# Patient Record
Sex: FEMALE | Race: WHITE | NOT HISPANIC OR LATINO | Employment: FULL TIME | URBAN - METROPOLITAN AREA
[De-identification: names, ages, dates, MRNs, and addresses within clinical notes are randomized per-mention and may not be internally consistent; named-entity substitution may affect disease eponyms.]

---

## 2017-06-14 ENCOUNTER — OFFICE VISIT - RIVER FALLS (OUTPATIENT)
Dept: FAMILY MEDICINE | Facility: CLINIC | Age: 47
End: 2017-06-14

## 2017-06-14 ASSESSMENT — MIFFLIN-ST. JEOR: SCORE: 1138.39

## 2017-06-22 ENCOUNTER — OFFICE VISIT - RIVER FALLS (OUTPATIENT)
Dept: FAMILY MEDICINE | Facility: CLINIC | Age: 47
End: 2017-06-22

## 2018-08-13 ENCOUNTER — OFFICE VISIT - RIVER FALLS (OUTPATIENT)
Dept: FAMILY MEDICINE | Facility: CLINIC | Age: 48
End: 2018-08-13

## 2018-08-13 ASSESSMENT — MIFFLIN-ST. JEOR: SCORE: 1129.32

## 2019-08-22 ENCOUNTER — OFFICE VISIT - RIVER FALLS (OUTPATIENT)
Dept: FAMILY MEDICINE | Facility: CLINIC | Age: 49
End: 2019-08-22

## 2019-08-22 ASSESSMENT — MIFFLIN-ST. JEOR: SCORE: 1137.48

## 2019-12-06 ENCOUNTER — AMBULATORY - RIVER FALLS (OUTPATIENT)
Dept: FAMILY MEDICINE | Facility: CLINIC | Age: 49
End: 2019-12-06

## 2020-06-25 ENCOUNTER — OFFICE VISIT - RIVER FALLS (OUTPATIENT)
Dept: FAMILY MEDICINE | Facility: CLINIC | Age: 50
End: 2020-06-25

## 2020-06-25 ASSESSMENT — MIFFLIN-ST. JEOR: SCORE: 1145.65

## 2021-01-05 ENCOUNTER — OFFICE VISIT - RIVER FALLS (OUTPATIENT)
Dept: FAMILY MEDICINE | Facility: CLINIC | Age: 51
End: 2021-01-05

## 2021-05-26 ENCOUNTER — RECORDS - HEALTHEAST (OUTPATIENT)
Dept: ADMINISTRATIVE | Facility: CLINIC | Age: 51
End: 2021-05-26

## 2021-11-23 ENCOUNTER — OFFICE VISIT - RIVER FALLS (OUTPATIENT)
Dept: FAMILY MEDICINE | Facility: CLINIC | Age: 51
End: 2021-11-23

## 2022-02-11 VITALS
DIASTOLIC BLOOD PRESSURE: 78 MMHG | SYSTOLIC BLOOD PRESSURE: 126 MMHG | HEART RATE: 72 BPM | TEMPERATURE: 98.3 F | WEIGHT: 127 LBS | HEIGHT: 62 IN | BODY MASS INDEX: 23.37 KG/M2

## 2022-02-12 VITALS
HEART RATE: 63 BPM | OXYGEN SATURATION: 99 % | SYSTOLIC BLOOD PRESSURE: 124 MMHG | DIASTOLIC BLOOD PRESSURE: 64 MMHG | BODY MASS INDEX: 24.51 KG/M2 | WEIGHT: 134 LBS | RESPIRATION RATE: 16 BRPM

## 2022-02-12 VITALS
HEART RATE: 56 BPM | HEIGHT: 62 IN | TEMPERATURE: 98.8 F | SYSTOLIC BLOOD PRESSURE: 132 MMHG | BODY MASS INDEX: 23.7 KG/M2 | WEIGHT: 128.8 LBS | DIASTOLIC BLOOD PRESSURE: 90 MMHG

## 2022-02-12 VITALS
DIASTOLIC BLOOD PRESSURE: 70 MMHG | TEMPERATURE: 97.8 F | SYSTOLIC BLOOD PRESSURE: 122 MMHG | WEIGHT: 129 LBS | SYSTOLIC BLOOD PRESSURE: 122 MMHG | HEIGHT: 62 IN | BODY MASS INDEX: 23.74 KG/M2 | DIASTOLIC BLOOD PRESSURE: 84 MMHG | HEART RATE: 52 BPM | TEMPERATURE: 98.1 F | HEART RATE: 72 BPM

## 2022-02-12 VITALS
HEART RATE: 68 BPM | HEIGHT: 62 IN | SYSTOLIC BLOOD PRESSURE: 120 MMHG | BODY MASS INDEX: 24.03 KG/M2 | WEIGHT: 130.6 LBS | TEMPERATURE: 99.2 F | DIASTOLIC BLOOD PRESSURE: 70 MMHG

## 2022-02-15 NOTE — NURSING NOTE
Comprehensive Intake Entered On:  1/5/2021 1:48 PM CST    Performed On:  1/5/2021 1:43 PM CST by Lanette Sosa MA               Summary   Chief Complaint :   verbal consent given for video visit.  med check, needs refills.  also discuss rash on eyelid--starting to improve.   Lanette Sosa MA - 1/5/2021 1:43 PM CST   Health Status   Allergies Verified? :   Yes   Medication History Verified? :   Yes   Immunizations Current :   Yes   Medical History Verified? :   Yes   Pre-Visit Planning Status :   Completed   Lanette Sosa MA - 1/5/2021 1:43 PM CST   Consents   Consent for Immunization Exchange :   Consent Granted   Consent for Immunizations to Providers :   Consent Granted   Lanette Sosa MA - 1/5/2021 1:43 PM CST   Meds / Allergies   (As Of: 1/5/2021 1:48:12 PM CST)   Allergies (Active)   penicillin  Estimated Onset Date:   Unspecified ; Created By:   Marleny Mojica; Reaction Status:   Active ; Category:   Drug ; Substance:   penicillin ; Type:   Allergy ; Updated By:   Marleny Mojica; Source:   Paper Chart ; Reviewed Date:   6/25/2020 2:27 PM CDT        Medication List   (As Of: 1/5/2021 1:48:12 PM CST)   Prescription/Discharge Order    albuterol  :   albuterol ; Status:   Prescribed ; Ordered As Mnemonic:   Ventolin HFA 90 mcg/inh inhalation aerosol ; Simple Display Line:   2 puff(s), inh, qid, 1 EA, 11 Refill(s) ; Ordering Provider:   Scooby Jaquez MD; Catalog Code:   albuterol ; Order Dt/Tm:   8/22/2019 2:50:33 PM CDT          fexofenadine  :   fexofenadine ; Status:   Prescribed ; Ordered As Mnemonic:   fexofenadine 180 mg oral tablet ; Simple Display Line:   180 mg, 1 tab(s), PO, Daily, 90 tab(s) ; Ordering Provider:   García Matute MD; Catalog Code:   fexofenadine ; Order Dt/Tm:   10/24/2011 4:41:16 PM CDT          fluticasone  :   fluticasone ; Status:   Prescribed ; Ordered As Mnemonic:   Flovent  mcg/inh inhalation aerosol ; Simple Display Line:   2 puff(s), inh, bid, 1  EA, 11 Refill(s) ; Ordering Provider:   Scooby Jaquez MD; Catalog Code:   fluticasone ; Order Dt/Tm:   8/22/2019 2:50:49 PM CDT            ID Risk Screen   Recent Travel History :   No recent travel   Family Member Travel History :   No recent travel   Other Exposure to Infectious Disease :   Unknown   Lanette Sosa MA - 1/5/2021 1:43 PM CST   Social History   Social History   (As Of: 1/5/2021 1:48:12 PM CST)   Alcohol:  Low Risk      (Last Updated: 3/9/2010 9:20:56 AM CST by Marleny Alba CMA )         Tobacco:  Denies Tobacco Use      Never (less than 100 in lifetime)   (Last Updated: 1/5/2021 1:45:49 PM CST by Lanette Sosa MA)          Electronic Cigarette/Vaping:        Electronic Cigarette Use: Never.   (Last Updated: 1/5/2021 1:45:54 PM CST by Lanette Sosa MA)          Employment/School:        Employed, Work/School description: Teacher.   (Last Updated: 4/27/2010 8:32:23 AM CDT by Elaina Kovacs CMA)          Exercise:  Regular exercise      Exercise frequency: 5-6 times/week.  Exercise type: Bicycling, Swimming, Weight lifting.   (Last Updated: 10/14/2011 8:50:18 AM CDT by Lennie DaS ilva)

## 2022-02-15 NOTE — NURSING NOTE
Depression Screening Entered On:  8/22/2019 2:46 PM CDT    Performed On:  8/22/2019 2:46 PM CDT by Ian JOYCE, Lanette               Depression Screening   Little Interest - Pleasure in Activities :   Not at all   Feeling Down, Depressed, Hopeless :   Not at all   Initial Depression Screen Score :   0    Trouble Falling or Staying Asleep :   More than half the days   Feeling Tired or Little Energy :   Not at all   Poor Appetite or Overeating :   Not at all   Feeling Bad About Yourself :   Not at all   Trouble Concentrating :   Not at all   Moving or Speaking Slowly :   Not at all   Thoughts Better Off Dead or Hurting Self :   Not at all   Detailed Depression Screen Score :   2    Total Depression Screen Score :   2    WALKER Difficulty with Work, Home, Others :   Somewhat difficult   Ian JOYCE, Lanette - 8/22/2019 2:46 PM CDT

## 2022-02-15 NOTE — PROGRESS NOTES
Chief Complaint    asthma f/u, med refills, pt referral for knees  History of Present Illness      Patient is here for follow-up on her asthma.  She has been doing well.  Symptoms.  She has needed beer all occasional basis.  She is compliant with use of her steroid inhaler.  She occasionally has trouble with asthma in the late summer due to seasonal allergies.       She would like a referral to physical therapy for ongoing patellofemoral syndrome.  She has been to physical therapy in the past with good results.  Review of Systems      See HPI.  All other review of systems negative.  Physical Exam   Vitals & Measurements    T: 98.3   F (Tympanic)  HR: 72(Peripheral)  BP: 126/78       Alert, oriented, no acute distress       Normal heart rate       Nonlabored breathing, lungs are clear, no wheezes, rhonchi       No obvious deformity of the knees, no crepitus, normal range of motion, no tenderness  Assessment/Plan       Asthma(J45.30)        Well-controlled, continue current controller and reliever therapy       Patellofemoral syndrome(M22.2X9)        Physical therapy, exercises needed, follow-up if not improving         Orders:          Physical Therapy (Request), Patellofemoral syndrome       Orders:         albuterol, 2 puff(s), inh, qid, # 1 EA, 11 Refill(s), Type: Hard Stop, Pharmacy: [facility], (Completed)         albuterol, 2 puff(s), inh, qid, # 1 EA, 11 Refill(s), Type: Maintenance, Pharmacy: [facility], 2 puff(s) Inhale qid, (Ordered)         fluticasone, 2 puff(s), inh, bid, # 1 EA, 11 Refill(s), Type: Hard Stop, Pharmacy: [facility], (Completed)         fluticasone, 2 puff(s), inh, bid, # 1 EA, 11 Refill(s), Type: Maintenance, Pharmacy: [facility], 2 puff(s) Inhale bid, (Ordered)  Patient Information     Name:EVI ARAGON      Address:      [address] [place] [address]      Sex:Female      YOB: 1970      [phone]      Emergency Contact:DECLINED, UNKNOWN     MRN:[id number]      FIN:[id number]     Location:[facility]     Date of Service:2018      Primary Care Physician:       Scooby Jaquez MD, [phone]      Attending Physician:       Scooby Jaquez MD, [phone]  Problem List/Past Medical History    Ongoing     Asthma     Chickenpox     Seasonal Allergies    Historical     No qualifying data  Procedure/Surgical History     Herniorrhaphy for recurrent inguinal hernia     TAB - Temporal artery biopsy     Therapeutic      Tonsillectomy  Medications        fexofenadine 180 mg oral tablet: 180 mg, 1 tab(s), PO, Daily, 90 tab(s).        Ventolin HFA 90 mcg/inh inhalation aerosol: 2 puff(s), inh, qid, 1 EA, 11 Refill(s).        Flovent  mcg/inh inhalation aerosol: 2 puff(s), inh, bid, 1 EA, 11 Refill(s).                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 Exercise type: Bicycling, Swimming, Weight lifting., 10/14/2011     Tobacco - Denies Tobacco Use, 2010  Family History    Alzheimer's disease: Grandmother (P).    Asthma: Grandfather (P).    COPD - Chronic obstructive pulmonary disease: Grandfather (P).    Cancer: Negative: Grandfather (P).    Cardiomyopathy: Father.    Hypertension: Mother and Grandfather (M).  Immunizations      Vaccine Date Status      influenza virus vaccine, inactivated 2016 Given      tetanus/diphth/pertuss (Tdap) adult/adol 2016 Given      influenza virus vaccine, inactivated 10/27/2014 Given      influenza virus vaccine, inactivated 2013 Given      influenza virus vaccine, inactivated 10/31/2012 Given      influenza virus vaccine, inactivated 10/10/2011 Given      pneumococcal (PPSV23) 2010 Given      Td  07/15/2004 Recorded      Td 01/01/1996 Recorded

## 2022-02-15 NOTE — NURSING NOTE
Asthma Control Test (ACT) Total Entered On:  8/22/2019 2:46 PM CDT    Performed On:  8/22/2019 2:46 PM CDT by Lanette Sosa MA               Asthma Control Test (ACT) Total   Asthma Control Test Total (Adult) :   20    Lanette Sosa MA - 8/22/2019 2:46 PM CDT

## 2022-02-15 NOTE — PROGRESS NOTES
Chief Complaint    Pt c/o finding a lump L side breast that was painful. It was irriatating with her bra rubbing on it.  History of Present Illness      noticed a left axillary mass 8 days ago, it was painful for 24 hours, then it receded within 2 days, occurred about 2 days after getting a covid vaccine on the left and a flu on the right      asthma well controlled, needs med refills      need for cervical cancer screening,      overdue for breast cancer screening  Review of Systems      neg except HPI  Physical Exam   Vitals & Measurements    HR: 63 (Peripheral)  RR: 16  BP: 124/64  SpO2: 99%     WT: 134 lb       breast, no masses, no axillary  LAD, no skin dimpling, no nipple discharge  Assessment/Plan       Asthma (J45.30)         well controlled, cont current meds         Ordered:          Miscellaneous Prescription, valved holding chamber spacer, See Instructions, Instructions: use with albuterol, Supply, # 1 EA, 0 Refill(s), Type: Maintenance, Pharmacy: [facility], use with albuterol, 62, in, 06/25/20 14:24:00 CDT, Height Measured, 134, lb, 11/23/21 9:03:00 CS..., (Ordered)                Breast cancer screening (Z12.39)         given info to local hospitals, can call to schedule appt                Cervical cancer screening (Z12.4)         overdue                Left axillary swelling (M79.89)         now resolved, occurred after flu and covid vaccines, suspect reactive LAD, still needs routine breast cancer screening                Screening for diabetes mellitus (DM) (Z13.1)         overdue                Screening, lipid (Z13.220)         overdue                Vaccine reaction (T50.Z95A)         caused left ax swelling, now resolved                Orders:         albuterol, 2 puff(s), inh, qid, # 1 EA, 3 Refill(s), Type: Maintenance, Pharmacy: [facility], 2 puff(s) Inhale qid, 62, in, 06/25/20 14:24:00 CDT, Height Measured, 134, lb, 11/23/21 9:03:00 CST, Weight Measured, (Ordered)          fluticasone, = 2 puff(s), inh, bid, # 3 EA, 3 Refill(s), Type: Maintenance, Pharmacy: [facility], 2 puff(s) Inhale bid, 62, in, 20 14:24:00 CDT, Height Measured, 134, lb, 21 9:03:00 CST, Weight Measured, (Ordered)         Return to Clinic (Request), Return in 4-5 weeks, after labs for annual well adult with pap         Return to Clinic (Request), Return in 4 weeks lab only fasting lipid panel and BMP for screening      will have pt get her fasting screening labs done when she is in [place] break in about  a month and then have annual well tommy exam wheree we can get her pap up to date and review labs  Patient Information     Name:EVI ARAGON      Address:      [address] [place] [id number]     Sex:Female     YOB: 1970     [phone]     Emergency Contact:JERE ARAGON     MRN:[id number]     FIN:[id number]     Location:[facility]     Date of Service:2021      Primary Care Physician:       Scooby Jaquez MD, [phone]      Attending Physician:       More Gracia MD, [phone]  Problem List/Past Medical History    Ongoing     Asthma     Chickenpox     Seasonal allergies    Historical     No qualifying data  Procedure/Surgical History     Esophagogastroduodenoscopy (2019)      Comments: Indication:  Abdominal pain; failure to respond to treatment..     Herniorrhaphy for recurrent inguinal hernia     TAB - Temporal artery biopsy     Therapeutic      Tonsillectomy  Medications    Flovent  mcg/inh inhalation aerosol, 2 puff(s), Inhale, bid, 11 refills    Ventolin HFA 90 mcg/inh inhalation aerosol, 2 puff(s), Inhale, qid, 11 refills  Allergies    penicillin  Social History    Smoking Status     Never smoker     Alcohol - Low Risk     Electronic Cigarette/Vaping      Electronic Cigarette Use: Never.     Employment/School      Employed, Work/School description: Teacher.     Exercise - Regular exercise      Exercise  frequency: 5-6 times/week. Exercise type: Bicycling, Swimming, Weight lifting.     Tobacco - Denies Tobacco Use      Never (less than 100 in lifetime)  Family History    Alzheimer's disease: Grandmother (P).    Asthma: Grandfather (P).    COPD - Chronic obstructive pulmonary disease: Grandfather (P).    Cancer: Negative: Grandfather (P).    Cardiomyopathy: Father.    Hypertension: Mother and Grandfather (M).  Immunizations       Scheduled Immunizations       Dose Date(s)       pneumococcal (PPSV23)       08/26/2010       Other Immunizations               influenza virus vaccine, inactivated       10/10/2011, 10/31/2012, 09/24/2013, 10/27/2014, 08/23/2016, 12/06/2019       Td       01/01/1996, 07/15/2004       tetanus/diphth/pertuss (Tdap) adult/adol       07/21/2016

## 2022-02-15 NOTE — NURSING NOTE
Comprehensive Intake Entered On:  6/25/2020 2:31 PM CDT    Performed On:  6/25/2020 2:24 PM CDT by Josy Puga CMA               Summary   Chief Complaint :   c/o right thumb pain for the past 2 weeks and check moles on back    Weight Measured :   130.6 lb(Converted to: 130 lb 10 oz, 59.24 kg)    Height Measured :   62 in(Converted to: 5 ft 2 in, 157.48 cm)    Body Mass Index :   23.88 kg/m2   Body Surface Area :   1.61 m2   Systolic Blood Pressure :   120 mmHg   Diastolic Blood Pressure :   70 mmHg   Mean Arterial Pressure :   87 mmHg   Peripheral Pulse Rate :   68 bpm   BP Site :   Right arm   Pulse Site :   Radial artery   BP Method :   Manual   HR Method :   Manual   Temperature Tympanic :   99.2 DegF(Converted to: 37.3 DegC)    Josy Puga CMA - 6/25/2020 2:24 PM CDT   Health Status   Allergies Verified? :   Yes   Medication History Verified? :   Yes   Immunizations Current :   Yes   Medical History Verified? :   No   Pre-Visit Planning Status :   Not completed   Tobacco Use? :   Never smoker   Josy Puga CMA - 6/25/2020 2:24 PM CDT   Consents   Consent for Immunization Exchange :   Consent Granted   Consent for Immunizations to Providers :   Consent Granted   Josy Puga CMA - 6/25/2020 2:24 PM CDT   Meds / Allergies   (As Of: 6/25/2020 2:31:10 PM CDT)   Allergies (Active)   penicillin  Estimated Onset Date:   Unspecified ; Created By:   Marleny Mojica; Reaction Status:   Active ; Category:   Drug ; Substance:   penicillin ; Type:   Allergy ; Updated By:   Marleny Mojica; Source:   Paper Chart ; Reviewed Date:   6/25/2020 2:27 PM CDT        Medication List   (As Of: 6/25/2020 2:31:10 PM CDT)   Prescription/Discharge Order    albuterol  :   albuterol ; Status:   Prescribed ; Ordered As Mnemonic:   Ventolin HFA 90 mcg/inh inhalation aerosol ; Simple Display Line:   2 puff(s), inh, qid, 1 EA, 11 Refill(s) ; Ordering Provider:   Scooby Jaquez MD; Catalog Code:   albuterol ; Order  Dt/Tm:   8/22/2019 2:50:33 PM CDT          fexofenadine  :   fexofenadine ; Status:   Prescribed ; Ordered As Mnemonic:   fexofenadine 180 mg oral tablet ; Simple Display Line:   180 mg, 1 tab(s), PO, Daily, 90 tab(s) ; Ordering Provider:   García Matute MD; Catalog Code:   fexofenadine ; Order Dt/Tm:   10/24/2011 4:41:16 PM CDT          fluticasone  :   fluticasone ; Status:   Prescribed ; Ordered As Mnemonic:   Flovent  mcg/inh inhalation aerosol ; Simple Display Line:   2 puff(s), inh, bid, 1 EA, 11 Refill(s) ; Ordering Provider:   Scooby Jaquez MD; Catalog Code:   fluticasone ; Order Dt/Tm:   8/22/2019 2:50:49 PM CDT            ID Risk Screen   Recent Travel History :   No recent travel   Family Member Travel History :   No recent travel   Other Exposure to Infectious Disease :   Unknown   Josy Puga CMA - 6/25/2020 2:24 PM CDT

## 2022-02-15 NOTE — TELEPHONE ENCOUNTER
---------------------  From: Aysha Leal LPN (Phone Messages Pool ([id number]_[facility]))   To: Phone Messages Pool ([id number]_[place] [facility] [place]);     Sent: 4/19/2019 11:13:57 AM CDT  Subject: General Message     Phone Message    PCP:   JAM      Time of Call:  9:44am       Person Calling:  pt  Phone number:  [phone]    Returned call at: 11:12am    Note:   Pt LM stating she is wondering if she needs appt. Pt says she would like to talk to someone about her symptoms and will be available until about 10.    Returned call and asked that pt call back and leave detailed message with symptoms.    Last office visit and reason:  8/13/18 Follow up asthma, patellofemoral syndrome3:24pm Pt calls back and left another message. She c/o a rash on her arm that has been present for several weeks. The rash is itchy. Wondering if she needs an appt.  3:31pm Called and spoke with pt. Advised she be seen being that it has been going on for so long. I told her we have walk in hours from 8-5 tomorrow. She would like to set something up for Monday if possible. Transferred to scheduling.

## 2022-02-15 NOTE — NURSING NOTE
CAGE Assessment Entered On:  8/22/2019 2:46 PM CDT    Performed On:  8/22/2019 2:46 PM CDT by Lanette Sosa MA               Assessment   Have you ever felt you should cut down on your drinking :   No   Have people annoyed you by criticizing your drinking :   No   Have you ever felt bad or guilty about your drinking :   No   Have you ever taken a drink first thing in the morning to steady your nerves or get rid of a hangover (Eye-opener) :   No   CAGE Score :   0    Lanette Sosa MA - 8/22/2019 2:46 PM CDT

## 2022-02-15 NOTE — PROGRESS NOTES
Chief Complaint    med check.   also c/o rash on left hand and left forearm.  History of Present Illness      Patient is here for her asthma follow up and refill her medications.  Her asthma has been fairly well controlled, is worse when it's humid outside.  Uses her Albuterol as needed, has had to use more lately.  She also uses Flovent daily.  Denies visits to the ER or hospital.      She also complains of having a rash to her left hand and left forearm.  She did get stung by a wasp while cleaning out her flower bed as well as falling in another flower bed that she was clearing out.  She does have some redness and mild swelling to both sites.  The discomfort is worse at night.  She has tried using Benadryl gel and Cortisone cream.  Has also tried oral Benadryl last night and generic Allegra yesterday, applying ice.  Review of Systems           See HPI.  All other review of systems negative.              Physical Exam   Vitals & Measurements    T: 98.8   F (Tympanic)  HR: 56(Peripheral)  BP: 132/90     HT: 62 in  WT: 128.8 lb  BMI: 23.56           General:  Alert and oriented, No acute distress.            Eye:  Pupils are equal, round and reactive to light, Normal conjunctiva.            HENT:  Oral mucosa is moist.            Neck:  Supple.            Respiratory:  Respirations are non-labored.            Cardiovascular:  Normal rate, Regular rhythm, No edema.            Gastrointestinal:  Non-distended.            Musculoskeletal:  Normal gait.            Integumentary:  Warm, Rash to left hand and forearm, slight warmth to left hand, mild swelling and redness.            Psychiatric:  Cooperative, Appropriate mood & affect, Normal judgment.           Patient s PHQ-9 and CAGE questionnaire reviewed and discussed with patient.                  PHQ-9=2         Assessment/Plan       1. Asthma (J45.30)        Stable, continue with current medications.  Refills sent in.  RTC 1year for asthma follow up.       2. Wasp  sting (T63.461A)        Continue with Benadryl, switch from Allegra to Zyrtec for antihistamine.  Also try using OTC topical lidocaine for pain relief.       Orders:         albuterol, 2 puff(s), inh, qid, # 1 EA, 11 Refill(s), Type: Maintenance, Pharmacy: [facility], 2 puff(s) Inhale qid, (Ordered)         albuterol, 2 puff(s), inh, qid, # 1 EA, 0 Refill(s), Type: Hard Stop, Pharmacy: [facility], (Completed)         fluticasone, = 2 puff(s), inh, bid, # 1 EA, 0 Refill(s), Type: Hard Stop, Pharmacy: [facility], (Completed)         fluticasone, = 2 puff(s), inh, bid, # 1 EA, 11 Refill(s), Type: Maintenance, Pharmacy: [facility], 2 puff(s) Inhale bid, (Ordered)         Return to Clinic (Request), RFV: Medication check for asthma, Return in 1yr      Lanette DEAN MA, acted solely as a scribe for, and in the presence of Dr. Scooby Jaquez who performed the services.             I, Scooby Jaquez MD, personally performed the services described in this documentation.  The documentation was scribed in my presence and is both accurate and complete.                Patient Information     Name:EVI ARAGON      Address:      [address] [place] [id number]     Sex:Female     YOB: 1970     [phone]     Emergency Contact:DECLINED, UNKNOWN     MRN:[id number]     FIN:[id number]     Location[facility]     Date of Service:08/22/2019      Primary Care Physician:       Scooby Jaquez MD, [phone]      Attending Physician:       Scooby Jaquez MD, [phone]  Problem List/Past Medical History    Ongoing     Asthma     Chickenpox     Seasonal allergies    Historical     No qualifying data  Procedure/Surgical History     Esophagogastroduodenoscopy (02/08/2019)            Comments: Indication:  Abdominal pain; failure to respond to treatment..     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 Exercise type: Bicycling, Swimming, Weight lifting., 10/14/2011     Tobacco - Denies Tobacco Use, 2010  Family History    Alzheimer's disease: Grandmother (P).    Asthma: Grandfather (P).    COPD - Chronic obstructive pulmonary disease: Grandfather (P).    Cancer: Negative: Grandfather (P).    Cardiomyopathy: Father.    Hypertension: Mother and Grandfather (M).  Immunizations      Vaccine Date Status      influenza virus vaccine, inactivated 2016 Given      tetanus/diphth/pertuss (Tdap) adult/adol 2016 Given      influenza virus vaccine, inactivated 10/27/2014 Given      influenza virus vaccine, inactivated 2013 Given      influenza virus vaccine, inactivated 10/31/2012 Given      influenza virus vaccine, inactivated 10/10/2011 Given      pneumococcal (PPSV23) 2010 Given      Td 07/15/2004 Recorded      Td 1996 Recorded

## 2022-02-15 NOTE — NURSING NOTE
Phone Message    PCP:    JAM      Time of Call:  8:12       Person Calling:  Pt  Phone number:  [phone]    Time returned call:  8:35    Note:  Bebe called needed a refill of Flovent.  she was last seen in 6?17 but forgot to mention refills.  ACT done and her score was 17.  Her symptoms have been exacerbated recently due to pollen but usually is only bothered occasionally  with SOB then has long spells without any symptoms.    Advised to f/u with JAM yearly for asthma focused visit.    Flovent refilled

## 2022-02-15 NOTE — PROGRESS NOTES
Chief Complaint    verbal consent given for video visit.  med check, needs refills.  also discuss rash on eyelid--starting to improve.   Visit type:  Video visit via [employer] or [employer]   Participants in room during visit: Patient   Location of patient: Work   Location of physician: Office   Video start time: 1410   Video end time: 1423   Today's visit was conducted by video conference due to the COVID-19 pandemic.  The patient's consent to proceed with the video conference was obtained and documented.  History of Present Illness       Patient has concerns today about a rash on her eyelids.  This is, in the last few weeks.  She denies any new exposures.  There has been no swelling.       She is requesting refill of her inhalers for her well-controlled persistent asthma.  She rarely uses her albuterol inhaler.  She is compliant with fluticasone inhaler.       She also has seasonal allergies which have been a bit better this year.  She uses over-the-counter antihistamines.  She is currently taking cetirizine.  Review of Systems       See HPI.  All other review of systems negative.  Physical Exam       Appears well, no acute distress  Assessment/Plan       1. Asthma (J45.30)         Mild persistent asthma under control with current medication regimen.  Refill medications today.       2. Seasonal allergies (J30.2)         Seasonal allergies controlled with over-the-counter antihistamines       3. Dermatitis, eyelid (H01.9)         Eyelid dermatitis unclear cause, suspect contact dermatitis         Advise over-the-counter hydrocortisone cream applied sparingly and follow-up in the next couple weeks if symptoms not improving       Orders:         albuterol, 2 puff(s), inh, qid, # 1 EA, 11 Refill(s), Type: Hard Stop, Pharmacy: [facility], (Completed)         albuterol, 2 puff(s), inh, qid, # 1 EA, 11 Refill(s), Type: Maintenance, Pharmacy: [facility], 2 puff(s) Inhale qid, 62, in, 06/25/20 14:24:00 CDT, Height  Measured, 130.6, lb, 20 14:24:00 CDT, Weight Measured, (Ordered)         fluticasone, = 2 puff(s), inh, bid, # 1 EA, 11 Refill(s), Type: Hard Stop, Pharmacy: [facility], (Completed)         fluticasone, = 2 puff(s), inh, bid, # 1 EA, 11 Refill(s), Type: Maintenance, Pharmacy: [facility], 2 puff(s) Inhale bid, 62, in, 20 14:24:00 CDT, Height Measured, 130.6, lb, 20 14:24:00 CDT, Weight Measured, (Ordered)  Patient Information     Name:EVI ARAGON      Address:      [address] [place] [id number]     Sex:Female     YOB: 1970     [phone]     Emergency Contact:JERE ARAGON     MRN:[id number]     FIN:[id number]     Location:[facility]     Date of Service:2021      Primary Care Physician:       Scooby Jaquez MD, [phone]      Attending Physician:       Scooby Jaquez MD, [phone]  Problem List/Past Medical History    Ongoing     Asthma     Chickenpox     Seasonal allergies    Historical     No qualifying data  Procedure/Surgical History     Esophagogastroduodenoscopy (2019)      Comments: Indication:  Abdominal pain; failure to respond to treatment..     Herniorrhaphy for recurrent inguinal hernia     TAB - Temporal artery biopsy     Therapeutic      Tonsillectomy  Medications    fexofenadine 180 mg oral tablet, 180 mg= 1 tab(s), Oral, daily, 3 refills    Flovent  mcg/inh inhalation aerosol, 2 puff(s), Inhale, bid, 11 refills    Ventolin HFA 90 mcg/inh inhalation aerosol, 2 puff(s), Inhale, qid, 11 refills  Allergies    penicillin  Social History    Smoking Status     Never smoker     Alcohol - Low Risk     Electronic Cigarette/Vaping      Electronic Cigarette Use: Never.     Employment/School      Employed, Work/School description: Teacher.     Exercise - Regular exercise      Exercise frequency: 5-6 times/week. Exercise type: Bicycling, Swimming, Weight lifting.     Tobacco - Denies  Tobacco Use      Never (less than 100 in lifetime)  Family History    Alzheimer's disease: Grandmother (P).    Asthma: Grandfather (P).    COPD - Chronic obstructive pulmonary disease: Grandfather (P).    Cancer: Negative: Grandfather (P).    Cardiomyopathy: Father.    Hypertension: Mother and Grandfather (M).  Immunizations      Vaccine Date Status          influenza virus vaccine, inactivated 12/06/2019 Given          influenza virus vaccine, inactivated 08/23/2016 Given          tetanus/diphth/pertuss (Tdap) adult/adol 07/21/2016 Given          influenza virus vaccine, inactivated 10/27/2014 Given          influenza virus vaccine, inactivated 09/24/2013 Given          influenza virus vaccine, inactivated 10/31/2012 Given          influenza virus vaccine, inactivated 10/10/2011 Given          pneumococcal (PPSV23) 08/26/2010 Given          Td 07/15/2004 Recorded          Td 01/01/1996 Recorded

## 2022-02-15 NOTE — NURSING NOTE
Comprehensive Intake Entered On:  11/23/2021 9:13 AM CST    Performed On:  11/23/2021 9:03 AM CST by Fallon Cabral               Summary   Chief Complaint :   Pt c/o finding a lump L side breast that was painful. It was irriatating with her bra rubbing on it.     Weight Measured :   134 lb(Converted to: 134 lb 0 oz, 60.781 kg)    Systolic Blood Pressure :   124 mmHg   Diastolic Blood Pressure :   64 mmHg   Mean Arterial Pressure :   84 mmHg   Peripheral Pulse Rate :   63 bpm   BP Site :   Right arm   BP Method :   Manual   Respiratory Rate :   16 br/min   Oxygen Saturation :   99 %   Fallon Cabral - 11/23/2021 9:03 AM CST   Health Status   Allergies Verified? :   Yes   Medication History Verified? :   Yes   Immunizations Current :   Yes   Tobacco Use? :   Never smoker   Fallon Cabral - 11/23/2021 9:03 AM CST   Consents   Consent for Immunization Exchange :   Consent Granted   Consent for Immunizations to Providers :   Consent Granted   Fallon Cabral - 11/23/2021 9:03 AM CST   Meds / Allergies   (As Of: 11/23/2021 9:13:13 AM CST)   Allergies (Active)   penicillin  Estimated Onset Date:   Unspecified ; Created By:   Marleny Mojica; Reaction Status:   Active ; Category:   Drug ; Substance:   penicillin ; Type:   Allergy ; Updated By:   Marleny Mojica; Source:   Paper Chart ; Reviewed Date:   11/23/2021 9:13 AM CST        Medication List   (As Of: 11/23/2021 9:13:13 AM CST)   Prescription/Discharge Order    albuterol  :   albuterol ; Status:   Prescribed ; Ordered As Mnemonic:   Ventolin HFA 90 mcg/inh inhalation aerosol ; Simple Display Line:   2 puff(s), inh, qid, 1 EA, 11 Refill(s) ; Ordering Provider:   Scooby Jaquez MD; Catalog Code:   albuterol ; Order Dt/Tm:   1/5/2021 2:23:46 PM CST          fexofenadine  :   fexofenadine ; Status:   Processing ; Ordered As Mnemonic:   fexofenadine 180 mg oral tablet ; Ordering Provider:   García Matute MD; Action Display:   Complete ; Catalog Code:    fexofenadine ; Order Dt/Tm:   11/23/2021 9:10:12 AM CST          fluticasone  :   fluticasone ; Status:   Prescribed ; Ordered As Mnemonic:   Flovent  mcg/inh inhalation aerosol ; Simple Display Line:   2 puff(s), inh, bid, 1 EA, 11 Refill(s) ; Ordering Provider:   Scooby Jaquez MD; Catalog Code:   fluticasone ; Order Dt/Tm:   1/5/2021 2:23:45 PM CST            Social History   Social History   (As Of: 11/23/2021 9:13:13 AM CST)   Alcohol:  Low Risk      (Last Updated: 3/9/2010 9:20:56 AM CST by Marleny Alba CMA )         Tobacco:  Denies Tobacco Use      Never (less than 100 in lifetime)   (Last Updated: 1/5/2021 1:45:49 PM CST by Lanette Sosa MA)          Electronic Cigarette/Vaping:        Electronic Cigarette Use: Never.   (Last Updated: 1/5/2021 1:45:54 PM CST by Lanette Sosa MA)          Employment/School:        Employed, Work/School description: Teacher.   (Last Updated: 4/27/2010 8:32:23 AM CDT by Elaina Kovacs CMA)          Exercise:  Regular exercise      Exercise frequency: 5-6 times/week.  Exercise type: Bicycling, Swimming, Weight lifting.   (Last Updated: 10/14/2011 8:50:18 AM CDT by Lennie Da Silva)

## 2022-02-15 NOTE — NURSING NOTE
Comprehensive Intake Entered On:  8/22/2019 2:25 PM CDT    Performed On:  8/22/2019 2:18 PM CDT by Ian JOYCE, Lanette               Summary   Chief Complaint :   med check.   also c/o rash on left hand and left forearm.   Weight Measured :   128.8 lb(Converted to: 128 lb 13 oz, 58.42 kg)    Height Measured :   62 in(Converted to: 5 ft 2 in, 157.48 cm)    Body Mass Index :   23.56 kg/m2   Body Surface Area :   1.6 m2   Systolic Blood Pressure :   132 mmHg (HI)    Diastolic Blood Pressure :   90 mmHg (HI)    Mean Arterial Pressure :   104 mmHg   Peripheral Pulse Rate :   56 bpm (LOW)    BP Site :   Right arm   Pulse Site :   Radial artery   BP Method :   Manual   HR Method :   Manual   Temperature Tympanic :   98.8 DegF(Converted to: 37.1 DegC)    Lanette Sosa MA - 8/22/2019 2:18 PM CDT   Health Status   Allergies Verified? :   Yes   Medication History Verified? :   Yes   Immunizations Current :   Yes   Medical History Verified? :   Yes   Pre-Visit Planning Status :   Completed   Tobacco Use? :   Never smoker   Lanette Sosa MA - 8/22/2019 2:18 PM CDT   Consents   Consent for Immunization Exchange :   Consent Granted   Consent for Immunizations to Providers :   Consent Granted   Lanette Sosa MA - 8/22/2019 2:18 PM CDT   Meds / Allergies   (As Of: 8/22/2019 2:25:42 PM CDT)   Allergies (Active)   penicillin  Estimated Onset Date:   Unspecified ; Created By:   Marleny Mojica; Reaction Status:   Active ; Category:   Drug ; Substance:   penicillin ; Type:   Allergy ; Updated By:   Marleny Mojica; Source:   Paper Chart ; Reviewed Date:   8/13/2018 11:15 AM CDT        Medication List   (As Of: 8/22/2019 2:25:42 PM CDT)   Prescription/Discharge Order    albuterol  :   albuterol ; Status:   Prescribed ; Ordered As Mnemonic:   Ventolin HFA 90 mcg/inh inhalation aerosol ; Simple Display Line:   2 puff(s), inh, qid, 1 EA, 0 Refill(s) ; Ordering Provider:   Scooby Jaquez MD; Catalog Code:   albuterol ;  Order Dt/Tm:   8/19/2019 11:15:18 AM          fexofenadine  :   fexofenadine ; Status:   Prescribed ; Ordered As Mnemonic:   fexofenadine 180 mg oral tablet ; Simple Display Line:   180 mg, 1 tab(s), PO, Daily, 90 tab(s) ; Ordering Provider:   García Matute MD; Catalog Code:   fexofenadine ; Order Dt/Tm:   10/24/2011 4:41:16 PM          fluticasone  :   fluticasone ; Status:   Prescribed ; Ordered As Mnemonic:   Flovent  mcg/inh inhalation aerosol ; Simple Display Line:   2 puff(s), inh, bid, 1 EA, 0 Refill(s) ; Ordering Provider:   Scooby Jaquez MD; Catalog Code:   fluticasone ; Order Dt/Tm:   8/19/2019 11:15:18 AM            Social History   Social History   (As Of: 8/22/2019 2:25:42 PM CDT)   Alcohol:  Low Risk      (Last Updated: 3/9/2010 9:20:56 AM CST by Marleny Alba CMA )         Tobacco:  Denies Tobacco Use      (Last Updated: 3/9/2010 9:21:01 AM CST by Marleny Alba CMA )         Employment/School:        Employed, Work/School description: Teacher.   (Last Updated: 4/27/2010 8:32:23 AM CDT by Elaina Kovacs CMA)          Exercise:  Regular exercise      Exercise frequency: 5-6 times/week.  Exercise type: Bicycling, Swimming, Weight lifting.   (Last Updated: 10/14/2011 8:50:18 AM CDT by Lennie Da Silva)

## 2022-02-15 NOTE — PROGRESS NOTES
Chief Complaint  here to have growth on back of head checked. is getting larger in size and more tender. also discuss recurring pain in right arm. has done PT in past.  History of Present Illness  Patient is here with an enlarging tender epidermoid cyst in the back of her scalp. ?She would like to have this removed. ?Seems to be more symptomatic lately.  She also has anterior right shoulder pain this has improved since she has been doing physical therapy. ?If she does a lot of shoulder work with weights especially shoulder or bench presses it causes some trouble.  Review of Systems  Negative except for above  Problem List/Past Medical History  Ongoing  Asthma  Seasonal Allergies  Resolved  No resolved problems  Procedure/Surgical History  Herniorrhaphy for recurrent inguinal hernia,  TAB - Temporal artery biopsy,  Therapeutic ,  Tonsillectomy.  Home Medications  fexofenadine 180 mg oral tablet, 180 mg, 1 tab(s), po, daily, 3 refills  Flovent  mcg/inh inhalation aerosol, 2 puff(s), inh, bid, 11 refills  Ventolin HFA 90 mcg/inh inhalation aerosol, 2 puff(s), inh, qid, 11 refills  Allergies  penicillin  Social History  Employment and Education  Employed, Work/School description: Teacher.  Exercise and Physical Activity  Exercise frequency: 5-6 times/week. Exercise type: Bicycling, Swimming, Weight lifting.  Family History  Alzheimer's disease: Grandmother (P).  Asthma: Grandfather (P).  COPD - Chronic obstructive pulmonary disease: Grandfather (P).  Cardiomyopathy: Father.  Hypertension: Mother  and Grandfather (M).  Immunizations  Physical Exam  Vitals & Measurements  T:?97.8?(Tympanic)?  HR:?72?(Peripheral)?  BP:?122/84?  HT:?62?in?  WT:?129?lb?  BMI:?23.59?  Alert, oriented, no acute distress  2 cm slightly tender epidermoid cyst on the occiput  Anterior right shoulder tenderness over the biceps tendon, normal strength, normal range of motion  Assessment/Plan  Epidermoid cyst  Informed consent was  obtained  Skin around the cyst was cleansed with Betadine, anesthesia with 1% lidocaine with epinephrine  12 mm incision was made over the cyst and the skin was bluntly dissected away from the cyst. ?I was unable to express the cyst completely from the incision so the contents of the cyst was expressed and the sac was removed with blunt dissection. ?The?incision was closed with 3 staples. ?She will return in 7-10 days for staple removal  Tendinopathy of right biceps tendon  Advised rest, avoidance of activities that irritate the shoulder and follow-up if not resolve over the next several weeks

## 2022-02-15 NOTE — LETTER
(Inserted Image. Unable to display)     August 24, 2020      EVI ARAGON  [address]  [place], [place] [id number]          Dear EVI,      Thank you for selecting [facility] (previously [facility]) for your healthcare needs.      Our records indicate you are due for the following services:     Asthma Follow-up Office Visit ~ Please bring in your inhalers/asthma medications that you are currently using to your visit.       To schedule an appointment or if you have further questions, please contact your primary clinic:   [facility]       [phone]   [facility]       [phone]              [facility]     [phone]      Powered by [employer]    Sincerely,    Scooby Jaquez MD

## 2022-02-15 NOTE — LETTER
(Inserted Image. Unable to display)   December 22, 2021  EVI ARAGON  [address] [place] [address]        Dear EVI,    Thank you for selecting [facility] for your healthcare needs.    Our records indicate you are due for the following services:     Annual Physical and Gynecologic Exam ~ Yearly wellness exams are important for your ongoing health and wellness.  This exam gives you the opportunity to meet with your Healthcare Provider to review your health, update immunizations and to recommend preventive screenings that you may be due for.  At your wellness visit, your health care provider will determine the need for a gynecologic exam and/or pap smear.     Fasting Lab Tests ~ Please do not eat or drink anything 10 hours prior to your scheduled appointment time.  (Water and any medications that you may need are allowed unless directed otherwise.)     If you had your labs done at another facility or with Direct Access Lab Testing at [facility], please bring in a copy of the results to your next visit, mail a copy, or drop off a copy of your results to your Healthcare Provider.     (FYI   Regarding office visits: In some instances, a video visit or telephone visit may be offered as an option.)    To schedule an appointment or if you have further questions, please contact your clinic at [phone].    Powered by [employer]    Sincerely,    More Gracia MD

## 2022-02-15 NOTE — PROGRESS NOTES
Patient:   EVI ARAGON            MRN: [id number]            FIN: [id number]               Age:   47 years     Sex:  Female     :  1970   Associated Diagnoses:   Encounter for staple removal; Exercise counseling   Author:   Scooby Jaquez MD      Visit Information      Date of Service: 2017 06:11 pm  Performing Location: [facility]  Encounter#: [id number]      Chief Complaint   2017 6:18 PM CDT    staple removal from scalp--had cyst removal 17.       Patient is here for staple removal. Epidermoid cyst was removed on 17. There are no complications with the procedure.  She also has questions about physical activity. She feels she cannot exercise at the same level she has been able to in the past. Her life is quite busy and she has to fit in her exercise. She does go to exercise classes at the local [facility]. She denies significant shortness of breath, chest pain, lightheadedness, or palpitations during exercise.      Physical Examination   Vital Signs   2017 6:18 PM CDT Temperature Tympanic 98.1 DegF    Peripheral Pulse Rate 52 bpm  LOW    Pulse Site Radial artery    HR Method Manual    Systolic Blood Pressure 122 mmHg    Diastolic Blood Pressure 70 mmHg    Mean Arterial Pressure 87 mmHg    BP Site Right arm    BP Method Manual      Measurements from flowsheet : Measurements   2017 6:18 PM CDT    Ht/Wt Measurement Refused by Patient?     Yes     Head:  The incision of the back and scalp is healing nicely. Staples were removed without difficulty. No bleeding encountered..    Respiratory:  Lungs are clear to auscultation, Respirations are non-labored.    Cardiovascular:  Regular rhythm, Bradycardia.       Impression and Plan   Diagnosis     Encounter for staple removal ([id number] Z48.02).     Exercise counseling (QSD64-OT Z71.89).     Plan:  Staple removal as above    Discussed the need to gradually increase her activity to previous levels. I've advised wide variety of  activity. It discussed ways to increase activity during her daily life..

## 2022-02-15 NOTE — TELEPHONE ENCOUNTER
---------------------  From: Blanca Barber LPN   Sent: 8/19/2019 11:27:29 AM CDT  Subject: med refill     PCP:   Vida POLK     Time of Call:  _ 1103       Person Calling:  _ Tree  Phone number:  _ [phone]    Returned call at: _ 1113    Note:   _ Pt AMBROCIO stating she is in need of her asthma medication and wondering if she can get a refill or need to be seen.     Pt has never been seen here for a physical and last asthma check was 8-.     Per protocol sent 30 day supply of albuterol and flovent to [place]. Called pt and LM stating she is due for an appointment and physical but  sent in 1 month supply for her       Last office visit and reason:  _

## 2022-07-20 ENCOUNTER — OFFICE VISIT (OUTPATIENT)
Dept: FAMILY MEDICINE | Facility: CLINIC | Age: 52
End: 2022-07-20
Payer: COMMERCIAL

## 2022-07-20 VITALS
WEIGHT: 132 LBS | OXYGEN SATURATION: 96 % | HEART RATE: 60 BPM | BODY MASS INDEX: 24.29 KG/M2 | DIASTOLIC BLOOD PRESSURE: 80 MMHG | SYSTOLIC BLOOD PRESSURE: 120 MMHG | HEIGHT: 62 IN

## 2022-07-20 DIAGNOSIS — Z11.59 NEED FOR HEPATITIS C SCREENING TEST: ICD-10-CM

## 2022-07-20 DIAGNOSIS — Z12.4 CERVICAL CANCER SCREENING: ICD-10-CM

## 2022-07-20 DIAGNOSIS — Z11.4 SCREENING FOR HIV (HUMAN IMMUNODEFICIENCY VIRUS): ICD-10-CM

## 2022-07-20 DIAGNOSIS — Z12.31 VISIT FOR SCREENING MAMMOGRAM: ICD-10-CM

## 2022-07-20 DIAGNOSIS — Z13.1 SCREENING FOR DIABETES MELLITUS: ICD-10-CM

## 2022-07-20 DIAGNOSIS — Z00.00 ROUTINE GENERAL MEDICAL EXAMINATION AT A HEALTH CARE FACILITY: ICD-10-CM

## 2022-07-20 DIAGNOSIS — Z23 NEED FOR VACCINATION: ICD-10-CM

## 2022-07-20 DIAGNOSIS — Z12.11 SCREEN FOR COLON CANCER: ICD-10-CM

## 2022-07-20 DIAGNOSIS — J45.30 MILD PERSISTENT ASTHMA WITHOUT COMPLICATION: Primary | ICD-10-CM

## 2022-07-20 DIAGNOSIS — Z13.220 SCREENING FOR HYPERLIPIDEMIA: ICD-10-CM

## 2022-07-20 PROBLEM — J30.2 SEASONAL ALLERGIC RHINITIS: Status: ACTIVE | Noted: 2022-07-20

## 2022-07-20 PROCEDURE — 91306 COVID-19,PF,MODERNA (18+ YRS BOOSTER .25ML): CPT | Performed by: FAMILY MEDICINE

## 2022-07-20 PROCEDURE — 90472 IMMUNIZATION ADMIN EACH ADD: CPT | Performed by: FAMILY MEDICINE

## 2022-07-20 PROCEDURE — 90750 HZV VACC RECOMBINANT IM: CPT | Performed by: FAMILY MEDICINE

## 2022-07-20 PROCEDURE — 90471 IMMUNIZATION ADMIN: CPT | Performed by: FAMILY MEDICINE

## 2022-07-20 PROCEDURE — 99396 PREV VISIT EST AGE 40-64: CPT | Mod: 25 | Performed by: FAMILY MEDICINE

## 2022-07-20 PROCEDURE — 87624 HPV HI-RISK TYP POOLED RSLT: CPT | Performed by: FAMILY MEDICINE

## 2022-07-20 PROCEDURE — 90707 MMR VACCINE SC: CPT | Performed by: FAMILY MEDICINE

## 2022-07-20 PROCEDURE — 0064A COVID-19,PF,MODERNA (18+ YRS BOOSTER .25ML): CPT | Performed by: FAMILY MEDICINE

## 2022-07-20 PROCEDURE — G0145 SCR C/V CYTO,THINLAYER,RESCR: HCPCS | Performed by: FAMILY MEDICINE

## 2022-07-20 RX ORDER — ALBUTEROL SULFATE 90 UG/1
2 AEROSOL, METERED RESPIRATORY (INHALATION) EVERY 6 HOURS PRN
Qty: 18 G | Refills: 1 | Status: SHIPPED | OUTPATIENT
Start: 2022-07-20 | End: 2023-07-25

## 2022-07-20 RX ORDER — ALBUTEROL SULFATE 90 UG/1
2 AEROSOL, METERED RESPIRATORY (INHALATION)
COMMUNITY
End: 2023-02-17

## 2022-07-20 RX ORDER — FLUTICASONE PROPIONATE AND SALMETEROL 250; 50 UG/1; UG/1
1 POWDER RESPIRATORY (INHALATION) 2 TIMES DAILY
Qty: 3 EACH | Refills: 3 | Status: SHIPPED | OUTPATIENT
Start: 2022-07-20 | End: 2023-07-25 | Stop reason: DRUGHIGH

## 2022-07-20 RX ORDER — FEXOFENADINE HCL 180 MG/1
180 TABLET ORAL
COMMUNITY
End: 2023-02-17

## 2022-07-20 RX ORDER — FLUTICASONE PROPIONATE 110 UG/1
AEROSOL, METERED RESPIRATORY (INHALATION)
COMMUNITY
Start: 2021-11-23 | End: 2022-07-20

## 2022-07-20 ASSESSMENT — ENCOUNTER SYMPTOMS
CHILLS: 0
DIARRHEA: 0
WEAKNESS: 0
PARESTHESIAS: 0
BREAST MASS: 0
HEARTBURN: 0
PALPITATIONS: 0
SHORTNESS OF BREATH: 0
HEMATURIA: 0
JOINT SWELLING: 0
ARTHRALGIAS: 0
HEMATOCHEZIA: 0
NERVOUS/ANXIOUS: 1
CONSTIPATION: 0
EYE PAIN: 0
DIZZINESS: 0
COUGH: 0
FREQUENCY: 0
HEADACHES: 0
SORE THROAT: 0
ABDOMINAL PAIN: 0
NAUSEA: 0
DYSURIA: 0
MYALGIAS: 0
FEVER: 0

## 2022-07-20 NOTE — PROGRESS NOTES
SUBJECTIVE:   CC: Bebe Tom is an 52 year old woman who presents for preventive health visit.       Patient has been advised of split billing requirements and indicates understanding: Yes  Healthy Habits:     Getting at least 3 servings of Calcium per day:  NO    Bi-annual eye exam:  Yes    Dental care twice a year:  Yes    Sleep apnea or symptoms of sleep apnea:  None    Diet:  Regular (no restrictions)    Frequency of exercise:  6-7 days/week    Duration of exercise:  45-60 minutes    Taking medications regularly:  Yes    Medication side effects:  None    PHQ-2 Total Score: 1    Additional concerns today:  No            Today's PHQ-2 Score:   PHQ-2 ( 1999 Pfizer) 7/20/2022   Q1: Little interest or pleasure in doing things 0   Q2: Feeling down, depressed or hopeless 1   PHQ-2 Score 1   Q1: Little interest or pleasure in doing things Not at all   Q2: Feeling down, depressed or hopeless Several days   PHQ-2 Score 1       Do you feel safe in your environment? Yes    Have you ever done Advance Care Planning? (For example, a Health Directive, POLST, or a discussion with a medical provider or your loved ones about your wishes): No, advance care planning information given to patient to review.  Advanced care planning was discussed at today's visit.    Social History     Tobacco Use     Smoking status: Never Smoker     Smokeless tobacco: Never Used   Substance Use Topics     Alcohol use: Not on file         Alcohol Use 7/20/2022   Prescreen: >3 drinks/day or >7 drinks/week? No         Breast CA Risk Assessment (FHS-7) 7/20/2022   Do you have a family history of breast, colon, or ovarian cancer? No / Unknown     Pertinent mammograms are reviewed under the imaging tab.    History of abnormal Pap smear: no  PAP / HPV Latest Ref Rng & Units 7/20/2022   PAP   Negative for Intraepithelial Lesion or Malignancy (NILM)     Reviewed and updated as needed this visit by clinical staff   Tobacco  Allergies  Meds  Problems   "Med Hx  Surg Hx  Fam Hx            Reviewed and updated as needed this visit by Provider   Tobacco  Allergies  Meds  Problems  Med Hx  Surg Hx  Fam Hx           Past Medical History:   Diagnosis Date     Mild persistent asthma without complication       Past Surgical History:   Procedure Laterality Date     ABDOMINAL HERNIA REPAIR N/A       SECTION      x 2     EGD N/A 2019    normal     HERNIORRHAPHY INGUINAL      no date given     TONSILLECTOMY      no date given     OB History   No obstetric history on file.       Review of Systems   Constitutional: Negative for chills and fever.   HENT: Negative for congestion, ear pain, hearing loss and sore throat.    Eyes: Negative for pain and visual disturbance.   Respiratory: Negative for cough and shortness of breath.    Cardiovascular: Negative for chest pain, palpitations and peripheral edema.   Gastrointestinal: Negative for abdominal pain, constipation, diarrhea, heartburn, hematochezia and nausea.   Breasts:  Negative for tenderness, breast mass and discharge.   Genitourinary: Negative for dysuria, frequency, genital sores, hematuria, pelvic pain, urgency, vaginal bleeding and vaginal discharge.   Musculoskeletal: Negative for arthralgias, joint swelling and myalgias.   Skin: Negative for rash.   Neurological: Negative for dizziness, weakness, headaches and paresthesias.   Psychiatric/Behavioral: Negative for mood changes. The patient is nervous/anxious.           OBJECTIVE:   /80 (BP Location: Right arm, Patient Position: Sitting)   Pulse 60   Ht 1.581 m (5' 2.25\")   Wt 59.9 kg (132 lb)   SpO2 96%   BMI 23.95 kg/m    Physical Exam      General: alert and oriented ×3 no acute distress.    HEENT: Normocephalic and atraumatic.   Eyes pupils are equal round and reactive to light extraocular motion is intact. normal conjunctiva    Hearing is grossly normal and there is no otorrhea. Tympanic membranes are pearly grey with a normal light " reflex.    Nares are patent there is no rhinorrhea.     Mucous membranes are moist and pink.    Chest: has bilateral rise with no increased work of breathing. clear to auscultation without wheezes, rhonchi, or rales.    Cardiovascular: normal perfusion and brisk capillary refill. S1S2 with regular rate and rhythm and no murmurs, gallops or rubs.    Musculoskeletal: no gross focal abnormalities and normal gait.    Neuro: no gross focal abnormalities and memory seems intact. CN 2-12 are grossly intact.    Psychiatric: speech is clear and coherent and fluent. Patient dressed appropriately for the weather. Mood is appropriate and affect is full.              ASSESSMENT/PLAN:       ICD-10-CM    1. Mild persistent asthma without complication  J45.30 albuterol (PROAIR HFA/PROVENTIL HFA/VENTOLIN HFA) 108 (90 Base) MCG/ACT inhaler     fluticasone-salmeterol (WIXELA INHUB) 250-50 MCG/ACT inhaler   2. Screen for colon cancer  Z12.11 Fecal colorectal cancer screen (FIT)   3. Screening for HIV (human immunodeficiency virus)  Z11.4 HIV Antigen Antibody Combo   4. Need for hepatitis C screening test  Z11.59 Hepatitis C Screen Reflex to HCV RNA Quant and Genotype   5. Cervical cancer screening  Z12.4 Pap Screen with HPV - recommended age 30 - 65 years     HPV Hold (Lab Only)   6. Screening for hyperlipidemia  Z13.220 Lipid panel reflex to direct LDL Fasting   7. Visit for screening mammogram  Z12.31 MA SCREENING DIGITAL BILAT - Future  (s+30)   8. Screening for diabetes mellitus  Z13.1 Glucose   9. Need for vaccination  Z23 MMR, SUBQ (12+ MO)     COVID-19,PF,MODERNA (18+ YRS BOOSTER .25ML)   10. Routine general medical examination at a health care facility  Z00.00              COUNSELING:  Reviewed preventive health counseling, as reflected in patient instructions       Regular exercise       Healthy diet/nutrition       MMR vaccine reminder (1 dose) for patients born after 1959 with no documented vaccination/immunity         "Osteoporosis prevention/bone health       Consider Hep C screening for all patients one time for ages 18-79 years       HIV screeninx in teen years, 1x in adult years, and at intervals if high risk       Advance Care Planning    Estimated body mass index is 23.95 kg/m  as calculated from the following:    Height as of this encounter: 1.581 m (5' 2.25\").    Weight as of this encounter: 59.9 kg (132 lb).        She reports that she has never smoked. She has never used smokeless tobacco.      Counseling Resources:  ATP IV Guidelines  Pooled Cohorts Equation Calculator  Breast Cancer Risk Calculator  BRCA-Related Cancer Risk Assessment: FHS-7 Tool  FRAX Risk Assessment  ICSI Preventive Guidelines  Dietary Guidelines for Americans, 2010  USDA's MyPlate  ASA Prophylaxis  Lung CA Screening    More Gracia MD  [facility]  "

## 2022-07-20 NOTE — PATIENT INSTRUCTIONS
[employer]  Preventive Health Recommendations  Female Ages 50 - 64    Yearly exam: See your health care provider every year in order to  o Review health changes.   o Discuss preventive care.    o Review your medicines if your doctor has prescribed any.      Get a Pap test every three years (unless you have an abnormal result and your provider advises testing more often).    If you get Pap tests with HPV test, you only need to test every 5 years, unless you have an abnormal result.     You do not need a Pap test if your uterus was removed (hysterectomy) and you have not had cancer.    You should be tested each year for STDs (sexually transmitted diseases) if you're at risk.     Have a mammogram every 1 to 2 years.    Have a colonoscopy at age 50, or have a yearly FIT test (stool test). These exams screen for colon cancer.      Have a cholesterol test every 5 years, or more often if advised.    Have a diabetes test (fasting glucose) every three years. If you are at risk for diabetes, you should have this test more often.     If you are at risk for osteoporosis (brittle bone disease), think about having a bone density scan (DEXA).    Shots: Get a flu shot each year. Get a tetanus shot every 10 years.    Nutrition:     Eat at least 5 servings of fruits and vegetables each day.    Eat whole-grain bread, whole-wheat pasta and brown rice instead of white grains and rice.    Get adequate Calcium and Vitamin D.     Lifestyle    Exercise at least 150 minutes a week (30 minutes a day, 5 days a week). This will help you control your weight and prevent disease.    Limit alcohol to one drink per day.    No smoking.     Wear sunscreen to prevent skin cancer.     See your dentist every six months for an exam and cleaning.    See your eye doctor every 1 to 2 years.

## 2022-07-22 LAB
BKR LAB AP GYN ADEQUACY: NORMAL
BKR LAB AP GYN INTERPRETATION: NORMAL
BKR LAB AP HPV REFLEX: NORMAL
BKR LAB AP PREVIOUS ABNORMAL: NORMAL
PATH REPORT.COMMENTS IMP SPEC: NORMAL
PATH REPORT.COMMENTS IMP SPEC: NORMAL
PATH REPORT.RELEVANT HX SPEC: NORMAL

## 2022-07-26 LAB
HUMAN PAPILLOMA VIRUS 16 DNA: NEGATIVE
HUMAN PAPILLOMA VIRUS 18 DNA: NEGATIVE
HUMAN PAPILLOMA VIRUS FINAL DIAGNOSIS: NORMAL
HUMAN PAPILLOMA VIRUS OTHER HR: NEGATIVE

## 2022-09-23 NOTE — PROGRESS NOTES
Patient:   EVI ARAGON            MRN: [id number]            FIN: [id number]               Age:   50 years     Sex:  Female     :  1970   Associated Diagnoses:   Thumb pain; Seborrheic keratoses   Author:   Scooby Palumbo MD      Visit Information      Date of Service: 2020 02:20 pm  Performing Location: [facility]  Encounter#: [id number]      Primary Care Provider (PCP):  Scooby Jaquez MD    NPI# [id number]      Referring Provider:  Scooby Palumbo MD    NPI# [id number]      Chief Complaint   2020 2:24 PM CDT    c/o right thumb pain for the past 2 weeks and check moles on back        History of Present Illness   Has some mild swelling at the base of the thumb for the past two weeks. Has been gardening a lot with repetitive motion.      Review of Systems   No weakness  No numbness or tingling      Health Status   Allergies:    Allergic Reactions (Selected)  Severity Not Documented  Penicillin (No reactions were documented)   Medications:  (Selected)   Prescriptions  Prescribed  Flovent  mcg/inh inhalation aerosol: = 2 puff(s), inh, bid, # 1 EA, 11 Refill(s), Type: Maintenance, Pharmacy: [facility], 2 puff(s) Inhale bid  Ventolin HFA 90 mcg/inh inhalation aerosol: 2 puff(s), inh, qid, # 1 EA, 11 Refill(s), Type: Maintenance, Pharmacy: [facility], 2 puff(s) Inhale qid  fexofenadine 180 mg oral tablet: 1 tab(s) ( 180 mg ), PO, Daily, # 90 tab(s), 3 Refill(s), Type: Maintenance   Problem list:    All Problems  Asthma / SNOMED CT 6328743332 / Confirmed  Seasonal allergies / SNOMED CT 963955375 / Confirmed  Inactive: Chickenpox / SNOMED CT 33880013      Histories   Procedure history:    Esophagogastroduodenoscopy (SNOMED CT 287183764) performed by Alex Alejandre MD on 2019 at 49 Years.  Comments:  2019 8:18 AM [facility] - Inge Keating  Indication:  Abdominal pain; failure to respond to treatment.  Herniorrhaphy for recurrent inguinal hernia (SNOMED CT  Patient ID Amador Maciel is a [phone]).  Tonsillectomy (SNOMED CT 904964454).  TAB - Temporal artery biopsy (SNOMED CT 5251023009).  Therapeutic  (SNOMED CT 03669920).      Physical Examination   Vital Signs   2020 2:24 PM CDT Temperature Tympanic 99.2 DegF    Peripheral Pulse Rate 68 bpm    Pulse Site Radial artery    HR Method Manual    Systolic Blood Pressure 120 mmHg    Diastolic Blood Pressure 70 mmHg    Mean Arterial Pressure 87 mmHg    BP Site Right arm    BP Method Manual      Measurements from flowsheet : Measurements   2020 2:24 PM CDT Height Measured - Standard 62 in    Weight Measured - Standard 130.6 lb    BSA 1.61 m2    Body Mass Index 23.88 kg/m2      General:  Alert and oriented, No acute distress.    Musculoskeletal: tender right 1st metacarpal. No snuffbox tenderness. Pain with thumb extension and adduction  Skin: mild swelling over 1st metacarpal.   Right back: [id number] brownish exophytic lesion  Upper left back: [id number] brownish exophytic lesion      Impression and Plan   Diagnosis     Thumb pain (GTG21-KV M79.646).     Seborrheic keratoses (OTH65-RZ L82.1).       Thumb pain: likely tendonitis. Treat with naprosyn and brace  Skin lesions: likely seborrheic keratosis   Ilir Santacruz MD

## 2022-10-29 ENCOUNTER — OFFICE VISIT (OUTPATIENT)
Dept: URGENT CARE | Facility: URGENT CARE | Age: 52
End: 2022-10-29
Payer: COMMERCIAL

## 2022-10-29 VITALS
OXYGEN SATURATION: 97 % | RESPIRATION RATE: 16 BRPM | DIASTOLIC BLOOD PRESSURE: 88 MMHG | BODY MASS INDEX: 23.04 KG/M2 | SYSTOLIC BLOOD PRESSURE: 134 MMHG | TEMPERATURE: 97.7 F | WEIGHT: 127 LBS | HEART RATE: 60 BPM

## 2022-10-29 DIAGNOSIS — J40 BRONCHITIS: ICD-10-CM

## 2022-10-29 DIAGNOSIS — J45.31 MILD PERSISTENT ASTHMA WITH EXACERBATION: Primary | ICD-10-CM

## 2022-10-29 PROCEDURE — 99214 OFFICE O/P EST MOD 30 MIN: CPT

## 2022-10-29 RX ORDER — PREDNISONE 20 MG/1
40 TABLET ORAL DAILY
Qty: 10 TABLET | Refills: 0 | Status: SHIPPED | OUTPATIENT
Start: 2022-10-29 | End: 2022-11-03

## 2022-10-29 RX ORDER — AZITHROMYCIN 250 MG/1
TABLET, FILM COATED ORAL
Qty: 6 TABLET | Refills: 0 | Status: SHIPPED | OUTPATIENT
Start: 2022-10-29 | End: 2022-11-03

## 2022-10-29 RX ORDER — CETIRIZINE HYDROCHLORIDE 10 MG/1
10 TABLET ORAL DAILY
COMMUNITY

## 2022-10-29 ASSESSMENT — ASTHMA QUESTIONNAIRES
QUESTION_2 LAST FOUR WEEKS HOW OFTEN HAVE YOU HAD SHORTNESS OF BREATH: THREE TO SIX TIMES A WEEK
ACT_TOTALSCORE: 18
ACT_TOTALSCORE: 18
QUESTION_4 LAST FOUR WEEKS HOW OFTEN HAVE YOU USED YOUR RESCUE INHALER OR NEBULIZER MEDICATION (SUCH AS ALBUTEROL): NOT AT ALL
QUESTION_3 LAST FOUR WEEKS HOW OFTEN DID YOUR ASTHMA SYMPTOMS (WHEEZING, COUGHING, SHORTNESS OF BREATH, CHEST TIGHTNESS OR PAIN) WAKE YOU UP AT NIGHT OR EARLIER THAN USUAL IN THE MORNING: FOUR OR MORE NIGHTS A WEEK
QUESTION_1 LAST FOUR WEEKS HOW MUCH OF THE TIME DID YOUR ASTHMA KEEP YOU FROM GETTING AS MUCH DONE AT WORK, SCHOOL OR AT HOME: NONE OF THE TIME
QUESTION_5 LAST FOUR WEEKS HOW WOULD YOU RATE YOUR ASTHMA CONTROL: WELL CONTROLLED
ACUTE_EXACERBATION_TODAY: NO

## 2022-10-29 NOTE — PROGRESS NOTES
Assessment & Plan     Mild persistent asthma with exacerbation  I encouraged patient to use her albuterol more frequently, up to every 4 hours for tight congested cough and not just reserving for severe difficulty breathing.  We will treat with a short course of prednisone.  Given duration of her symptoms and currently worsening will cover for secondary bacterial infection with Zithromax given symptoms of bronchitis greater than 10 days.  Patient will follow-up on an as-needed basis.  She will continue with her allergy medication  - azithromycin (ZITHROMAX) 250 MG tablet  Dispense: 6 tablet; Refill: 0  - predniSONE (DELTASONE) 20 MG tablet  Dispense: 10 tablet; Refill: 0  - Nebulizer and Supplies Order for DME - ONLY FOR DME    Bronchitis:  as above   - azithromycin (ZITHROMAX) 250 MG tablet  Dispense: 6 tablet; Refill: 0  - predniSONE (DELTASONE) 20 MG tablet  Dispense: 10 tablet; Refill: 0    No name on file  [facility] PARTHA Spence is a 52 year old female who presents to clinic today for the following health issues:  Chief Complaint   Patient presents with     Nasal Congestion     X1.5 weeks. Drainage causing a cough. No SOB. Night sweats.     HPI  Head cold first.    Congestion first.    Getting better and then worsening with night time has a sensation of clicking or sputtering.    Feverish at night.    No sob, chest pain.  Does have some sinus pressure.    Cough is keeping up at night.    Congestion in the chest.    Does have mild persistent asthma.  Has not used albuterol more frequently.    On daily ICS/LABA        Objective    /88 (BP Location: Right arm, Patient Position: Sitting)   Pulse 60   Temp 97.7  F (36.5  C) (Tympanic)   Resp 16   Wt 57.6 kg (127 lb)   SpO2 97%   BMI 23.04 kg/m    Physical Exam   Pt is in no acute distress and appears well  Ears patent B:  TM s intact, non-injected. All land marks easily visibile    Nasal mucosa is non-edematous,  no discharge.    Pharynx: non erythematous, tonsils non hypertrophied, No exudate   Neck supple: no adenopathy  Lungs:expiartory wheezes throughout, no discrete rales.    Heart: RRR, no murmur, no thrills or heaves   Ext: no edema  Skin: no rashes

## 2022-10-29 NOTE — PATIENT INSTRUCTIONS
Albuterol every 4 hours as needed.    Start prednisone if needing albuterol more than every 4-6 hours.      Zithromax as ordered.

## 2023-02-17 ENCOUNTER — OFFICE VISIT (OUTPATIENT)
Dept: FAMILY MEDICINE | Facility: CLINIC | Age: 53
End: 2023-02-17
Payer: COMMERCIAL

## 2023-02-17 ENCOUNTER — NURSE TRIAGE (OUTPATIENT)
Dept: NURSING | Facility: CLINIC | Age: 53
End: 2023-02-17
Payer: COMMERCIAL

## 2023-02-17 VITALS
TEMPERATURE: 98.9 F | HEART RATE: 84 BPM | DIASTOLIC BLOOD PRESSURE: 82 MMHG | RESPIRATION RATE: 20 BRPM | SYSTOLIC BLOOD PRESSURE: 126 MMHG | HEIGHT: 62 IN | OXYGEN SATURATION: 95 % | WEIGHT: 130 LBS | BODY MASS INDEX: 23.92 KG/M2

## 2023-02-17 DIAGNOSIS — J32.9 SINUSITIS, UNSPECIFIED CHRONICITY, UNSPECIFIED LOCATION: ICD-10-CM

## 2023-02-17 DIAGNOSIS — J06.9 ACUTE URI: Primary | ICD-10-CM

## 2023-02-17 LAB
FLUAV RNA SPEC QL NAA+PROBE: NEGATIVE
FLUBV RNA RESP QL NAA+PROBE: NEGATIVE
RSV RNA SPEC NAA+PROBE: NEGATIVE
SARS-COV-2 RNA RESP QL NAA+PROBE: NEGATIVE

## 2023-02-17 PROCEDURE — 99213 OFFICE O/P EST LOW 20 MIN: CPT | Mod: CS | Performed by: FAMILY MEDICINE

## 2023-02-17 PROCEDURE — 87637 SARSCOV2&INF A&B&RSV AMP PRB: CPT | Performed by: FAMILY MEDICINE

## 2023-02-17 RX ORDER — DOXYCYCLINE HYCLATE 100 MG
100 TABLET ORAL 2 TIMES DAILY
Qty: 42 TABLET | Refills: 0 | Status: SHIPPED | OUTPATIENT
Start: 2023-02-17 | End: 2023-02-20

## 2023-02-17 ASSESSMENT — ASTHMA QUESTIONNAIRES
QUESTION_4 LAST FOUR WEEKS HOW OFTEN HAVE YOU USED YOUR RESCUE INHALER OR NEBULIZER MEDICATION (SUCH AS ALBUTEROL): NOT AT ALL
QUESTION_3 LAST FOUR WEEKS HOW OFTEN DID YOUR ASTHMA SYMPTOMS (WHEEZING, COUGHING, SHORTNESS OF BREATH, CHEST TIGHTNESS OR PAIN) WAKE YOU UP AT NIGHT OR EARLIER THAN USUAL IN THE MORNING: NOT AT ALL
QUESTION_1 LAST FOUR WEEKS HOW MUCH OF THE TIME DID YOUR ASTHMA KEEP YOU FROM GETTING AS MUCH DONE AT WORK, SCHOOL OR AT HOME: NONE OF THE TIME
QUESTION_5 LAST FOUR WEEKS HOW WOULD YOU RATE YOUR ASTHMA CONTROL: WELL CONTROLLED
ACT_TOTALSCORE: 24
QUESTION_2 LAST FOUR WEEKS HOW OFTEN HAVE YOU HAD SHORTNESS OF BREATH: NOT AT ALL
ACT_TOTALSCORE: 24

## 2023-02-17 ASSESSMENT — ENCOUNTER SYMPTOMS
SORE THROAT: 1
FEVER: 1
HEADACHES: 1
RHINORRHEA: 1
COUGH: 1
FATIGUE: 1

## 2023-02-17 ASSESSMENT — PAIN SCALES - GENERAL: PAINLEVEL: NO PAIN (0)

## 2023-02-17 NOTE — TELEPHONE ENCOUNTER
Triage Call:     Pt calling to report that she is a  and that she has been sick since Thanksgiving on and off. She has a dx of asthma and has been taking her medications as prescribed.     She has had cold sx for a couple weeks  Can not get well    Losing voice  Coughs really hard; severe coughing spells  Fever comes and goes  Body aches on and off  When she tries to sleep she has clicking; like there is fluid in her lungs    Taking:   Ibuprofen    Disposition: See in office today. Pt was given the care advice and was transferred to scheduling to check appt availability. Pt is aware of the nearby [facility] locations if no appts are available in clinic.   Reason for Disposition    SEVERE coughing spells (e.g., whooping sound after coughing, vomiting after coughing)    Fever returns after gone for over 24 hours and symptoms worse or not improved    Additional Information    Negative: Bluish (or gray) lips or face    Negative: SEVERE difficulty breathing (e.g., struggling for each breath, speaks in single words)    Negative: Rapid onset of cough and has hives    Negative: Coughing started suddenly after medicine, an allergic food or bee sting    Negative: Difficulty breathing after exposure to flames, smoke, or fumes    Negative: Sounds like a life-threatening emergency to the triager    Negative: Previous asthma attacks and this feels like asthma attack    Negative: Dry cough (non-productive; no sputum or minimal clear sputum) and within 14 days of COVID-19 Exposure    Negative: MODERATE difficulty breathing (e.g., speaks in phrases, SOB even at rest, pulse 100-120) and still present when not coughing    Negative: Chest pain present when not coughing    Negative: Passed out (i.e., fainted, collapsed and was not responding)    Negative: Patient sounds very sick or weak to the triager    Negative: MILD difficulty breathing (e.g., minimal/no SOB at rest, SOB with walking, pulse <100) and still present when not  coughing    Negative: Coughed up > 1 tablespoon (15 ml) blood (Exception: Blood-tinged sputum.)    Negative: Fever > 103 F (39.4 C)    Negative: Fever > 101 F (38.3 C) and over 60 years of age    Negative: Fever > 100.0 F (37.8 C) and has diabetes mellitus or a weak immune system (e.g., HIV positive, cancer chemotherapy, organ transplant, splenectomy, chronic steroids)    Negative: Fever > 100.0 F (37.8 C) and bedridden (e.g., nursing home patient, stroke, chronic illness, recovering from surgery)    Negative: Increasing ankle swelling    Negative: Wheezing is present    Negative: Coughing up shane-colored (reddish-brown) or blood-tinged sputum    Negative: Fever present > 3 days (72 hours)    Negative: Using nasal washes and pain medicine > 24 hours and sinus pain persists    Protocols used: COUGH-A-OH    Roxanna Brown RN  [facility] Nurse Advisor 7:58 AM 2/17/2023

## 2023-02-17 NOTE — PROGRESS NOTES
Assessment & Plan     Acute URI  Will treat if positive  - Symptomatic Influenza A/B & SARS-CoV2 (COVID-19) Virus PCR Multiplex Nasopharyngeal    Sinusitis, unspecified chronicity, unspecified location  Will treat if [id number] all negative  - doxycycline hyclate (VIBRA-TABS) 100 MG tablet; Take 1 tablet (100 mg) by mouth 2 times daily      Return if symptoms worsen or fail to improve.    Floyd Benjamin MD  [facility]    Kelly Spence is a 53 year old, presenting for the following health issues:  Cough (Nasal congestion, headache, fever, fatigue and body aches X2 weeks.)      Cough  Associated symptoms include headaches, rhinorrhea and sore throat.   History of Present Illness       Reason for visit:  Cough  Symptom onset:  1-2 weeks ago  Symptom intensity:  Severe  Symptom progression:  Staying the same  Had these symptoms before:  Yes  Has tried/received treatment for these symptoms:  Yes  Previous treatment was successful:  Yes  Prior treatment description:  Antibiotics and prednazone  What makes it worse:  A long day at work because it wears me out  What makes it better:  Doing nothing all day, and while i have none little cooking and no cleaning,i am not getting better and worried that i can't sustain this    She eats 4 or more servings of fruits and vegetables daily.She consumes 0 sweetened beverage(s) daily.She exercises with enough effort to increase her heart rate 30 to 60 minutes per day.  She exercises with enough effort to increase her heart rate 6 days per week.   She is taking medications regularly.       Asthma Follow-Up    Was ACT completed today?    Yes    ACT Total Scores 2/17/2023   ACT TOTAL SCORE (Goal Greater than or Equal to 20) 24   In the past 12 months, how many times did you visit the emergency room for your asthma without being admitted to the hospital? 0   In the past 12 months, how many times were you hospitalized overnight because of your asthma?  "0          How many days per week do you miss taking your asthma controller medication?  0    Please describe any recent triggers for your asthma: dust mites, humidity and cold air    Have you had any Emergency Room Visits, Urgent Care Visits, or Hospital Admissions since your last office visit?  No        Review of Systems   Constitutional: Positive for fatigue and fever.   HENT: Positive for congestion, rhinorrhea and sore throat.    Respiratory: Positive for cough.    Neurological: Positive for headaches.      Constitutional, HEENT, cardiovascular, pulmonary, gi and gu systems are negative, except as otherwise noted.      Objective    /82 (BP Location: Right arm, Patient Position: Sitting)   Pulse 84   Temp 98.9  F (37.2  C) (Tympanic)   Resp 20   Ht 1.581 m (5' 2.25\")   Wt 59 kg (130 lb)   LMP  (LMP Unknown)   SpO2 95%   BMI 23.59 kg/m    Body mass index is 23.59 kg/m .  Physical Exam   GENERAL: healthy, alert and no distress  HENT: normal cephalic/atraumatic, ear canals and TM's normal, nose and mouth without ulcers or lesions, rhinorrhea yellow and green, oropharynx clear and oral mucous membranes moist  NECK: no adenopathy, no asymmetry, masses, or scars and thyroid normal to palpation  RESP: lungs clear to auscultation - no rales, rhonchi or wheezes  CV: regular rate and rhythm, normal S1 S2, no S3 or S4, no murmur, click or rub, no peripheral edema and peripheral pulses strong  ABDOMEN: soft, nontender, no hepatosplenomegaly, no masses and bowel sounds normal  MS: no gross musculoskeletal defects noted, no edema                    "

## 2023-02-19 ENCOUNTER — NURSE TRIAGE (OUTPATIENT)
Dept: NURSING | Facility: CLINIC | Age: 53
End: 2023-02-19
Payer: COMMERCIAL

## 2023-02-20 ENCOUNTER — OFFICE VISIT (OUTPATIENT)
Dept: FAMILY MEDICINE | Facility: CLINIC | Age: 53
End: 2023-02-20
Payer: COMMERCIAL

## 2023-02-20 VITALS
OXYGEN SATURATION: 97 % | HEART RATE: 68 BPM | SYSTOLIC BLOOD PRESSURE: 138 MMHG | HEIGHT: 63 IN | RESPIRATION RATE: 16 BRPM | DIASTOLIC BLOOD PRESSURE: 80 MMHG | WEIGHT: 131 LBS | BODY MASS INDEX: 23.21 KG/M2

## 2023-02-20 DIAGNOSIS — J32.9 SINUSITIS, UNSPECIFIED CHRONICITY, UNSPECIFIED LOCATION: Primary | ICD-10-CM

## 2023-02-20 PROCEDURE — 99213 OFFICE O/P EST LOW 20 MIN: CPT | Performed by: FAMILY MEDICINE

## 2023-02-20 RX ORDER — AZITHROMYCIN 250 MG/1
TABLET, FILM COATED ORAL
Qty: 6 TABLET | Refills: 0 | Status: SHIPPED | OUTPATIENT
Start: 2023-02-20 | End: 2023-02-25

## 2023-02-20 NOTE — TELEPHONE ENCOUNTER
Bebe has developed an itchy rash to a large part of her upper back after she started taking Doxycycline yesterday    - Tonight - Rash to upper back and across shoulders  - Itching started last night; Increased today    Advised to see PCP within 24 hours  Care Advice reviewed    Nicki Bueno RN  [facility]      Reason for Disposition    Hives or itching    Additional Information    Negative: [1] Life-threatening reaction (anaphylaxis) in the past to the same drug AND [2] < 2 hours since exposure    Negative: Difficulty breathing or wheezing    Negative: [1] Hoarseness or cough AND [2] started soon after 1st dose of drug    Negative: [1] Swollen tongue AND [2] started soon after 1st dose of drug    Negative: [1] Purple or blood-colored rash (spots or dots) AND [2] fever    Negative: Sounds like a life-threatening emergency to the triager    Negative: Swollen tongue    Negative: [1] Widespread hives AND [2] onset < 2 hours of exposure to 1st dose of drug    Negative: Fever    Negative: Patient sounds very sick or weak to the triager    Negative: [1] Purple or blood-colored rash (spots or dots) AND [2] no fever AND [3] sounds well to triager    Negative: [1] Taking new prescription medication AND [2] rash within 4 hours of 1st dose    Negative: Large or small blisters on skin (i.e., fluid filled bubbles or sacs)    Negative: Bloody crusts on lips or sores in mouth    Negative: Face or lip swelling    Protocols used: RASH - WIDESPREAD ON DRUGS-A-

## 2023-02-20 NOTE — PROGRESS NOTES
"  Assessment & Plan   Problem List Items Addressed This Visit    None  Visit Diagnoses     Sinusitis, unspecified chronicity, unspecified location    -  Primary    Relevant Medications    azithromycin (ZITHROMAX) 250 MG tablet      Recently diagnosed with sinusitis and started with doxycycline.  Her symptoms were improving however she developed an itchy rash.  See image captured today.  We will have patient discontinue the doxycycline and switch over to azithromycin.  Return to clinic if symptoms worsen or do not improve.                 No follow-ups on file.    More Gracia MD  [facility]    Kelly Spence is a 53 year old, presenting for the following health issues:  Rash (Pt thinks she is having an allergic reaction to doxycyline Saturday. The rash came on last night. )      HPI           Review of Systems         Objective    /80 (BP Location: Right arm, Patient Position: Sitting)   Pulse 68   Resp 16   Ht 1.588 m (5' 2.5\")   Wt 59.4 kg (131 lb)   LMP  (LMP Unknown)   SpO2 97%   BMI 23.58 kg/m    Body mass index is 23.58 kg/m .  Physical Exam                       "

## 2023-07-25 ENCOUNTER — OFFICE VISIT (OUTPATIENT)
Dept: FAMILY MEDICINE | Facility: CLINIC | Age: 53
End: 2023-07-25
Payer: COMMERCIAL

## 2023-07-25 ENCOUNTER — LAB (OUTPATIENT)
Dept: FAMILY MEDICINE | Facility: CLINIC | Age: 53
End: 2023-07-25

## 2023-07-25 VITALS
WEIGHT: 129.6 LBS | HEART RATE: 63 BPM | BODY MASS INDEX: 22.96 KG/M2 | HEIGHT: 63 IN | SYSTOLIC BLOOD PRESSURE: 125 MMHG | TEMPERATURE: 98 F | DIASTOLIC BLOOD PRESSURE: 78 MMHG | OXYGEN SATURATION: 100 % | RESPIRATION RATE: 20 BRPM

## 2023-07-25 DIAGNOSIS — J45.30 MILD PERSISTENT ASTHMA WITHOUT COMPLICATION: ICD-10-CM

## 2023-07-25 DIAGNOSIS — Z13.220 SCREENING FOR HYPERLIPIDEMIA: ICD-10-CM

## 2023-07-25 DIAGNOSIS — Z13.1 SCREENING FOR DIABETES MELLITUS: ICD-10-CM

## 2023-07-25 DIAGNOSIS — Z12.11 SCREEN FOR COLON CANCER: ICD-10-CM

## 2023-07-25 DIAGNOSIS — Z00.00 HEALTH MAINTENANCE EXAMINATION: Primary | ICD-10-CM

## 2023-07-25 LAB
CHOLEST SERPL-MCNC: 212 MG/DL
FASTING STATUS PATIENT QL REPORTED: NORMAL
GLUCOSE SERPL-MCNC: 91 MG/DL (ref 70–99)
HDLC SERPL-MCNC: 73 MG/DL
LDLC SERPL CALC-MCNC: 127 MG/DL
NONHDLC SERPL-MCNC: 139 MG/DL
TRIGL SERPL-MCNC: 58 MG/DL

## 2023-07-25 PROCEDURE — 99396 PREV VISIT EST AGE 40-64: CPT | Mod: 25 | Performed by: FAMILY MEDICINE

## 2023-07-25 PROCEDURE — 80061 LIPID PANEL: CPT | Performed by: FAMILY MEDICINE

## 2023-07-25 PROCEDURE — 82947 ASSAY GLUCOSE BLOOD QUANT: CPT | Mod: QW | Performed by: FAMILY MEDICINE

## 2023-07-25 PROCEDURE — 99214 OFFICE O/P EST MOD 30 MIN: CPT | Mod: 25 | Performed by: FAMILY MEDICINE

## 2023-07-25 PROCEDURE — 36415 COLL VENOUS BLD VENIPUNCTURE: CPT | Performed by: FAMILY MEDICINE

## 2023-07-25 PROCEDURE — 90750 HZV VACC RECOMBINANT IM: CPT | Performed by: FAMILY MEDICINE

## 2023-07-25 PROCEDURE — 90471 IMMUNIZATION ADMIN: CPT | Performed by: FAMILY MEDICINE

## 2023-07-25 RX ORDER — FLUTICASONE PROPIONATE AND SALMETEROL 250; 50 UG/1; UG/1
1 POWDER RESPIRATORY (INHALATION) 2 TIMES DAILY
Qty: 3 EACH | Refills: 3 | Status: CANCELLED | OUTPATIENT
Start: 2023-07-25

## 2023-07-25 RX ORDER — FLUTICASONE PROPIONATE AND SALMETEROL 500; 50 UG/1; UG/1
1 POWDER RESPIRATORY (INHALATION) EVERY 12 HOURS
Qty: 1 EACH | Refills: 5 | Status: SHIPPED | OUTPATIENT
Start: 2023-07-25 | End: 2024-02-13

## 2023-07-25 RX ORDER — ALBUTEROL SULFATE 90 UG/1
2 AEROSOL, METERED RESPIRATORY (INHALATION) EVERY 6 HOURS PRN
Qty: 18 G | Refills: 1 | Status: SHIPPED | OUTPATIENT
Start: 2023-07-25 | End: 2024-03-15

## 2023-07-25 RX ORDER — ALBUTEROL SULFATE 90 UG/1
2 AEROSOL, METERED RESPIRATORY (INHALATION) EVERY 6 HOURS PRN
Qty: 18 G | Refills: 1 | Status: CANCELLED | OUTPATIENT
Start: 2023-07-25

## 2023-07-25 ASSESSMENT — ASTHMA QUESTIONNAIRES: ACT_TOTALSCORE: 13

## 2023-07-25 ASSESSMENT — ENCOUNTER SYMPTOMS
DIARRHEA: 0
HEADACHES: 0
DIZZINESS: 0
NAUSEA: 0
HEMATURIA: 0
SHORTNESS OF BREATH: 1
PARESTHESIAS: 0
WEAKNESS: 0
JOINT SWELLING: 0
ARTHRALGIAS: 0
SORE THROAT: 0
PALPITATIONS: 0
CONSTIPATION: 0
FEVER: 0
CHILLS: 0
EYE PAIN: 0
HEARTBURN: 0
MYALGIAS: 0
COUGH: 0
BREAST MASS: 0
DYSURIA: 0
FREQUENCY: 0
HEMATOCHEZIA: 0
ABDOMINAL PAIN: 0
NERVOUS/ANXIOUS: 0
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
## 2023-08-15 LAB — NONINV COLON CA DNA+OCC BLD SCRN STL QL: NEGATIVE

## 2024-02-12 DIAGNOSIS — J45.30 MILD PERSISTENT ASTHMA WITHOUT COMPLICATION: ICD-10-CM

## 2024-02-13 RX ORDER — FLUTICASONE PROPIONATE AND SALMETEROL 500; 50 UG/1; UG/1
POWDER RESPIRATORY (INHALATION)
Qty: 60 EACH | Refills: 5 | Status: SHIPPED | OUTPATIENT
Start: 2024-02-13 | End: 2024-07-09

## 2024-03-14 DIAGNOSIS — J45.30 MILD PERSISTENT ASTHMA WITHOUT COMPLICATION: ICD-10-CM

## 2024-03-15 RX ORDER — ALBUTEROL SULFATE 90 UG/1
AEROSOL, METERED RESPIRATORY (INHALATION)
Qty: 6.7 G | Refills: 1 | Status: SHIPPED | OUTPATIENT
Start: 2024-03-15 | End: 2024-07-09

## 2024-07-08 ENCOUNTER — PATIENT OUTREACH (OUTPATIENT)
Dept: CARE COORDINATION | Facility: CLINIC | Age: 54
End: 2024-07-08
Payer: COMMERCIAL

## 2024-07-09 ENCOUNTER — OFFICE VISIT (OUTPATIENT)
Dept: FAMILY MEDICINE | Facility: CLINIC | Age: 54
End: 2024-07-09
Payer: COMMERCIAL

## 2024-07-09 VITALS
DIASTOLIC BLOOD PRESSURE: 79 MMHG | SYSTOLIC BLOOD PRESSURE: 143 MMHG | TEMPERATURE: 97.8 F | BODY MASS INDEX: 22.31 KG/M2 | HEART RATE: 57 BPM | HEIGHT: 63 IN | RESPIRATION RATE: 16 BRPM | OXYGEN SATURATION: 99 % | WEIGHT: 125.9 LBS

## 2024-07-09 DIAGNOSIS — Z00.00 HEALTH MAINTENANCE EXAMINATION: Primary | ICD-10-CM

## 2024-07-09 DIAGNOSIS — M54.41 RIGHT-SIDED LOW BACK PAIN WITH RIGHT-SIDED SCIATICA, UNSPECIFIED CHRONICITY: ICD-10-CM

## 2024-07-09 DIAGNOSIS — J45.30 MILD PERSISTENT ASTHMA WITHOUT COMPLICATION: ICD-10-CM

## 2024-07-09 PROCEDURE — 99396 PREV VISIT EST AGE 40-64: CPT | Performed by: FAMILY MEDICINE

## 2024-07-09 PROCEDURE — 99214 OFFICE O/P EST MOD 30 MIN: CPT | Mod: 25 | Performed by: FAMILY MEDICINE

## 2024-07-09 RX ORDER — FLUTICASONE PROPIONATE AND SALMETEROL 500; 50 UG/1; UG/1
1 POWDER RESPIRATORY (INHALATION) 2 TIMES DAILY
Qty: 60 EACH | Refills: 11 | Status: SHIPPED | OUTPATIENT
Start: 2024-07-09

## 2024-07-09 RX ORDER — ALBUTEROL SULFATE 90 UG/1
2 AEROSOL, METERED RESPIRATORY (INHALATION) EVERY 4 HOURS PRN
Qty: 6.7 G | Refills: 1 | Status: SHIPPED | OUTPATIENT
Start: 2024-07-09

## 2024-07-09 SDOH — HEALTH STABILITY: PHYSICAL HEALTH: ON AVERAGE, HOW MANY MINUTES DO YOU ENGAGE IN EXERCISE AT THIS LEVEL?: 50 MIN

## 2024-07-09 SDOH — HEALTH STABILITY: PHYSICAL HEALTH: ON AVERAGE, HOW MANY DAYS PER WEEK DO YOU ENGAGE IN MODERATE TO STRENUOUS EXERCISE (LIKE A BRISK WALK)?: 6 DAYS

## 2024-07-09 ASSESSMENT — ASTHMA QUESTIONNAIRES
ACT_TOTALSCORE: 17
QUESTION_5 LAST FOUR WEEKS HOW WOULD YOU RATE YOUR ASTHMA CONTROL: SOMEWHAT CONTROLLED
QUESTION_4 LAST FOUR WEEKS HOW OFTEN HAVE YOU USED YOUR RESCUE INHALER OR NEBULIZER MEDICATION (SUCH AS ALBUTEROL): ONE OR TWO TIMES PER DAY
QUESTION_2 LAST FOUR WEEKS HOW OFTEN HAVE YOU HAD SHORTNESS OF BREATH: THREE TO SIX TIMES A WEEK
QUESTION_1 LAST FOUR WEEKS HOW MUCH OF THE TIME DID YOUR ASTHMA KEEP YOU FROM GETTING AS MUCH DONE AT WORK, SCHOOL OR AT HOME: A LITTLE OF THE TIME
QUESTION_3 LAST FOUR WEEKS HOW OFTEN DID YOUR ASTHMA SYMPTOMS (WHEEZING, COUGHING, SHORTNESS OF BREATH, CHEST TIGHTNESS OR PAIN) WAKE YOU UP AT NIGHT OR EARLIER THAN USUAL IN THE MORNING: NOT AT ALL
ACT_TOTALSCORE: 17

## 2024-07-09 ASSESSMENT — SOCIAL DETERMINANTS OF HEALTH (SDOH): HOW OFTEN DO YOU GET TOGETHER WITH FRIENDS OR RELATIVES?: NEVER

## 2024-07-09 NOTE — PROGRESS NOTES
Preventive Care Visit  [facility]  Scooby Jaquez MD, Family Medicine  Jul 9, 2024      Assessment & Plan     Health maintenance examination  Health maintenance, routine follow-up, preventive services.  She is up-to-date.    Mild persistent asthma without complication  Doing well.  We have considered addition of montelukast.  She will continue with her current dose of fluticasone-albuterol and use of albuterol as needed.  Follow-up if symptoms worsen  - albuterol (PROAIR HFA/PROVENTIL HFA/VENTOLIN HFA) 108 (90 Base) MCG/ACT inhaler; Inhale 2 puffs into the lungs every 4 hours as needed for shortness of breath, wheezing or cough  - fluticasone-salmeterol (ADVAIR) 500-50 MCG/ACT inhaler; Inhale 1 puff into the lungs 2 times daily      Sciatica  We have discussed stretching, strengthening, and other exercises to help with the symptoms.  If she does not experience any improvement over the next month consider physical therapy referral.    Patient has been advised of split billing requirements and indicates understanding: Yes        Counseling  Appropriate preventive services were discussed with this patient, including applicable screening as appropriate for fall prevention, nutrition, physical activity, Tobacco-use cessation, weight loss and cognition.  Checklist reviewing preventive services available has been given to the patient.  Reviewed patient's diet, addressing concerns and/or questions.   Patient is at risk for social isolation and has been provided with information about the benefit of social connection.       MEDICATIONS:  Continue current medications without change  Regular exercise    Kelly Spence is a 54 year old, presenting for the following:  Physical        7/9/2024    10:22 AM   Additional Questions   Roomed by Meg        Health Care Directive  Patient does not have a Health Care Directive or Living Will: Discussed advance care planning with patient; information  given to patient to review.    HPI  Asthma is well controlled with increase in controller although still needs albuterol more frequently.  She uses it regularly prior to activity.    She has tightness in her right buttock which radiates to low back and hamstring.  This is worse with driving.            7/9/2024   General Health   How would you rate your overall physical health? Good   Feel stress (tense, anxious, or unable to sleep) Rather much      (!) STRESS CONCERN      7/9/2024   Nutrition   Three or more servings of calcium each day? (!) I DON'T KNOW   Diet: Regular (no restrictions)   How many servings of fruit and vegetables per day? 4 or more   How many sweetened beverages each day? 0-1            7/9/2024   Exercise   Days per week of moderate/strenous exercise 6 days   Average minutes spent exercising at this level 50 min            7/9/2024   Social Factors   Frequency of gathering with friends or relatives Never   Worry food won't last until get money to buy more No   Food not last or not have enough money for food? No   Do you have housing? (Housing is defined as stable permanent housing and does not include staying ouside in a car, in a tent, in an abandoned building, in an overnight shelter, or couch-surfing.) Yes   Are you worried about losing your housing? No   Lack of transportation? No   Unable to get utilities (heat,electricity)? No      (!) SOCIAL CONNECTIONS CONCERN      7/9/2024   Fall Risk   Fallen 2 or more times in the past year? No   Trouble with walking or balance? No             7/9/2024   Dental   Dentist two times every year? Yes            7/9/2024   TB Screening   Were you born outside of the [place]? No            Today's PHQ-2 Score:       7/9/2024    10:14 AM   PHQ-2 ( 1999 [employer])   Q1: Little interest or pleasure in doing things 0   Q2: Feeling down, depressed or hopeless 0   PHQ-2 Score 0   Q1: Little interest or pleasure in doing things Not at all   Q2: Feeling down, depressed or  hopeless Not at all   PHQ-2 Score 0           2024   Substance Use   Alcohol more than 3/day or more than 7/wk No   Do you use any other substances recreationally? No        Social History     Tobacco Use    Smoking status: Never    Smokeless tobacco: Never   Vaping Use    Vaping status: Never Used          Mammogram Screening - Mammogram every 1-2 years updated in Health Maintenance based on mutual decision making        2024   STI Screening   New sexual partner(s) since last STI/HIV test? No        History of abnormal Pap smear: No - age 21 - 65 - Patient with other risk factor requiring more frequent screening - see link Cervical Cytology Screening Guidelines        Latest Ref Rng & Units 2022     1:54 PM   PAP / HPV   PAP  Negative for Intraepithelial Lesion or Malignancy (NILM)    HPV 16 DNA Negative Negative    HPV 18 DNA Negative Negative    Other HR HPV Negative Negative      ASCVD Risk   The 10-year ASCVD risk score (Rivka JADE, et al., 2019) is: 2.2%    Values used to calculate the score:      Age: 54 years      Sex: Female      Is Non- : No      Diabetic: No      Tobacco smoker: No      Systolic Blood Pressure: 143 mmHg      Is BP treated: No      HDL Cholesterol: 73 mg/dL      Total Cholesterol: 212 mg/dL           Reviewed and updated as needed this visit by Provider                    Past Medical History:   Diagnosis Date    Mild persistent asthma without complication      Past Surgical History:   Procedure Laterality Date    ABDOMINAL HERNIA REPAIR N/A      SECTION      x 2    EGD N/A 2019    normal    HERNIORRHAPHY INGUINAL      no date given    TONSILLECTOMY      no date given     OB History   No obstetric history on file.         Review of Systems  Constitutional, HEENT, cardiovascular, pulmonary, GI, , musculoskeletal, neuro, skin, endocrine and psych systems are negative, except as otherwise noted.     Objective    Exam  BP (!) 143/79  "(BP Location: Right arm, Patient Position: Sitting, Cuff Size: Adult Large)   Pulse 57   Temp 97.8  F (36.6  C) (Tympanic)   Resp 16   Ht 1.588 m (5' 2.5\")   Wt 57.1 kg (125 lb 14.4 oz)   LMP  (LMP Unknown)   SpO2 99%   BMI 22.66 kg/m     Estimated body mass index is 22.66 kg/m  as calculated from the following:    Height as of this encounter: 1.588 m (5' 2.5\").    Weight as of this encounter: 57.1 kg (125 lb 14.4 oz).    Physical Exam  GENERAL: alert and no distress  EYES: Eyes grossly normal to inspection, PERRL and conjunctivae and sclerae normal  HENT: ear canals and TM's normal, nose and mouth without ulcers or lesions  NECK: no adenopathy, no asymmetry, masses, or scars  RESP: lungs clear to auscultation - no rales, rhonchi or wheezes  CV: regular rate and rhythm, normal S1 S2, no S3 or S4, no murmur, click or rub, no peripheral edema  ABDOMEN: soft, nontender, no hepatosplenomegaly, no masses and bowel sounds normal  MS: no gross musculoskeletal defects noted, no edema, tenderness in the right buttock, negative SLR, slight decreased mobility right hip  SKIN: no suspicious lesions or rashes  NEURO: Normal strength and tone, mentation intact and speech normal  PSYCH: mentation appears normal, affect normal/bright        Signed Electronically by: Scooby Jaquez MD    "

## 2024-07-24 ENCOUNTER — TELEPHONE (OUTPATIENT)
Dept: FAMILY MEDICINE | Facility: CLINIC | Age: 54
End: 2024-07-24
Payer: COMMERCIAL

## 2024-07-24 NOTE — TELEPHONE ENCOUNTER
Chart reviewed. Last appointment was 7/9 with PCP and was stated that if Sciatica isn't better than physical therapy referral could be placed.

## 2024-07-24 NOTE — TELEPHONE ENCOUNTER
Reason for Call:  Referral for physical therapy    Detailed comments: Please call to discuss    Phone Number Patient can be reached at: Home number on file [phone] (home)    Best Time: soon    Can we leave a detailed message on this number? YES    Call taken on 7/24/2024 at 11:32 AM by Elaina Pfeiffer

## 2024-07-25 NOTE — TELEPHONE ENCOUNTER
Pt will call insurance and determine what PT facility is covered. Will call back with further information.

## 2024-07-25 NOTE — TELEPHONE ENCOUNTER
I called and left voicemail for patient, if/when patient calls back, please inquire where she would like to be referred to for PT.

## 2024-08-05 ENCOUNTER — TRANSFERRED RECORDS (OUTPATIENT)
Dept: HEALTH INFORMATION MANAGEMENT | Facility: CLINIC | Age: 54
End: 2024-08-05
Payer: COMMERCIAL

## 2024-08-08 NOTE — TELEPHONE ENCOUNTER
PT Initial Eval from [facility] was received, routed to provider for signature, signed and faxed back to [facility] at [phone].

## 2024-10-05 ENCOUNTER — HEALTH MAINTENANCE LETTER (OUTPATIENT)
Age: 54
End: 2024-10-05

## 2024-12-30 ENCOUNTER — TRANSFERRED RECORDS (OUTPATIENT)
Dept: HEALTH INFORMATION MANAGEMENT | Facility: CLINIC | Age: 54
End: 2024-12-30
Payer: COMMERCIAL

## 2025-02-01 ENCOUNTER — PATIENT OUTREACH (OUTPATIENT)
Dept: CARE COORDINATION | Facility: CLINIC | Age: 55
End: 2025-02-01
Payer: COMMERCIAL

## 2025-04-03 ENCOUNTER — E-VISIT (OUTPATIENT)
Dept: URGENT CARE | Facility: CLINIC | Age: 55
End: 2025-04-03
Payer: COMMERCIAL

## 2025-04-03 DIAGNOSIS — R21 RASH: Primary | ICD-10-CM

## 2025-04-03 NOTE — PATIENT INSTRUCTIONS
Dear Bebe Tom,    We are sorry you are not feeling well. Based on the responses you provided, it is recommended that you be seen in-person in urgent care so we can better evaluate your symptoms. Please click here to find the nearest urgent care location to you.   You will not be charged for this Visit. Thank you for trusting us with your care.    Safia Wakefield PA-C

## 2025-04-15 ENCOUNTER — OFFICE VISIT (OUTPATIENT)
Dept: FAMILY MEDICINE | Facility: CLINIC | Age: 55
End: 2025-04-15
Payer: COMMERCIAL

## 2025-04-15 VITALS
RESPIRATION RATE: 16 BRPM | OXYGEN SATURATION: 98 % | SYSTOLIC BLOOD PRESSURE: 136 MMHG | BODY MASS INDEX: 23 KG/M2 | DIASTOLIC BLOOD PRESSURE: 80 MMHG | WEIGHT: 129.8 LBS | HEART RATE: 60 BPM | TEMPERATURE: 97.9 F | HEIGHT: 63 IN

## 2025-04-15 DIAGNOSIS — Z88.9 MULTIPLE DRUG ALLERGIES: ICD-10-CM

## 2025-04-15 DIAGNOSIS — J45.50 SEVERE PERSISTENT ASTHMA WITHOUT COMPLICATION (H): Primary | ICD-10-CM

## 2025-04-15 DIAGNOSIS — L03.011 PARONYCHIA OF FINGER OF RIGHT HAND: ICD-10-CM

## 2025-04-15 PROCEDURE — G2211 COMPLEX E/M VISIT ADD ON: HCPCS | Performed by: NURSE PRACTITIONER

## 2025-04-15 PROCEDURE — 99213 OFFICE O/P EST LOW 20 MIN: CPT | Performed by: NURSE PRACTITIONER

## 2025-04-15 PROCEDURE — 3079F DIAST BP 80-89 MM HG: CPT | Performed by: NURSE PRACTITIONER

## 2025-04-15 PROCEDURE — 3075F SYST BP GE 130 - 139MM HG: CPT | Performed by: NURSE PRACTITIONER

## 2025-04-15 RX ORDER — MUPIROCIN 20 MG/G
OINTMENT TOPICAL
COMMUNITY
Start: 2025-04-03 | End: 2025-04-15

## 2025-04-15 RX ORDER — TERBINAFINE HYDROCHLORIDE 250 MG/1
250 TABLET ORAL DAILY
Qty: 42 TABLET | Refills: 0 | Status: CANCELLED | OUTPATIENT
Start: 2025-04-15 | End: 2025-05-27

## 2025-04-15 ASSESSMENT — ASTHMA QUESTIONNAIRES
QUESTION_3 LAST FOUR WEEKS HOW OFTEN DID YOUR ASTHMA SYMPTOMS (WHEEZING, COUGHING, SHORTNESS OF BREATH, CHEST TIGHTNESS OR PAIN) WAKE YOU UP AT NIGHT OR EARLIER THAN USUAL IN THE MORNING: NOT AT ALL
QUESTION_1 LAST FOUR WEEKS HOW MUCH OF THE TIME DID YOUR ASTHMA KEEP YOU FROM GETTING AS MUCH DONE AT WORK, SCHOOL OR AT HOME: A LITTLE OF THE TIME
QUESTION_2 LAST FOUR WEEKS HOW OFTEN HAVE YOU HAD SHORTNESS OF BREATH: THREE TO SIX TIMES A WEEK
QUESTION_4 LAST FOUR WEEKS HOW OFTEN HAVE YOU USED YOUR RESCUE INHALER OR NEBULIZER MEDICATION (SUCH AS ALBUTEROL): ONE OR TWO TIMES PER DAY
QUESTION_5 LAST FOUR WEEKS HOW WOULD YOU RATE YOUR ASTHMA CONTROL: WELL CONTROLLED
ACT_TOTALSCORE: 18

## 2025-04-15 NOTE — PROGRESS NOTES
"  Assessment & Plan     (J45.50) Severe persistent asthma without complication (H)  (primary encounter diagnosis)  Comment:   Plan: fairly well controlled given seasonal allergies    (Z88.9) Multiple drug allergies  Comment:   Plan: Adult Allergy/Asthma  Referral        Will connect with allergist    (L03.011) Paronychia of finger of right hand  Comment:   Plan: recent infection has healed. There is some deformity on the distal half of the nail but not at the base. No raised/palpable deformity. I suspect the nail will grow out normally, the current condition does NOT look like onychomycosis and I encouraged her to hold off on antifungal      Subjective   Bebe is a 55 year old, presenting for the following health issues: would like to discuss possible nail fungus of 4th digit of right hand, wants to discuss reaction to antibiotics. Has needed to use benadryl with antibiotics in the past.    Most recent infection was 3 weeks on a 4th fingernail.  The nail is growing out looking abnormal on the tip but normal at the base    Asthma is typically trigger in the spring. Using albuterol more often and has increased her generic Wixella to bit, feels asthma is well controlled. See ACT  Fungal Infection        4/15/2025    10:17 AM   Additional Questions   Roomed by castillo li   Accompanied by self     HPI          7/9/2024    10:15 AM 1/10/2025     2:11 PM 4/15/2025    10:11 AM   ACT Total Scores   ACT TOTAL SCORE (Goal Greater than or Equal to 20) 17 12  18    In the past 12 months, how many times did you visit the emergency room for your asthma without being admitted to the hospital? 0 0 0   In the past 12 months, how many times were you hospitalized overnight because of your asthma? 0 0 0       Patient-reported                  Objective    /80 (BP Location: Right arm, Patient Position: Sitting)   Pulse 60   Temp 97.9  F (36.6  C)   Resp 16   Ht 1.588 m (5' 2.5\")   Wt 58.9 kg (129 lb 12.8 oz)   LMP  " (LMP Unknown)   SpO2 98%   Breastfeeding No   BMI 23.36 kg/m    Body mass index is 23.36 kg/m .  Physical Exam   GENERAL: alert and no distress  NECK: no adenopathy, no asymmetry, masses, or scars  RESP: lungs clear to auscultation - no rales, rhonchi or wheezes  CV: regular rate and rhythm, normal S1 S2, no S3 or S4, no murmur, click or rub, no peripheral edema  MS: no gross musculoskeletal defects noted, no edema  Nail as described above        The longitudinal plan of care for the diagnosis(es)/condition(s) as documented were addressed during this visit. Due to the added complexity in care, I will continue to support Bebe in the subsequent management and with ongoing continuity of care.      Signed Electronically by: Karla Pardo NP     no

## 2025-05-07 ENCOUNTER — MYC MEDICAL ADVICE (OUTPATIENT)
Dept: FAMILY MEDICINE | Facility: CLINIC | Age: 55
End: 2025-05-07
Payer: COMMERCIAL

## 2025-05-08 ENCOUNTER — TELEPHONE (OUTPATIENT)
Dept: FAMILY MEDICINE | Facility: CLINIC | Age: 55
End: 2025-05-08
Payer: COMMERCIAL

## 2025-05-08 NOTE — TELEPHONE ENCOUNTER
Patient Quality Outreach    Patient is due for the following:   Breast Cancer Screening - Mammogram    Action(s) Taken:   Schedule a office visit for mammogram    Type of outreach:    Sent [employer] message.    Questions for provider review:    None         Lanette Sosa MA  Chart routed to None.

## 2025-06-09 ENCOUNTER — PATIENT OUTREACH (OUTPATIENT)
Dept: CARE COORDINATION | Facility: CLINIC | Age: 55
End: 2025-06-09
Payer: COMMERCIAL

## 2025-06-16 ENCOUNTER — ANCILLARY PROCEDURE (OUTPATIENT)
Dept: MAMMOGRAPHY | Facility: CLINIC | Age: 55
End: 2025-06-16
Payer: COMMERCIAL

## 2025-06-16 DIAGNOSIS — Z12.31 VISIT FOR SCREENING MAMMOGRAM: ICD-10-CM

## 2025-06-16 PROCEDURE — 77067 SCR MAMMO BI INCL CAD: CPT | Mod: TC | Performed by: RADIOLOGY

## 2025-06-16 PROCEDURE — 77063 BREAST TOMOSYNTHESIS BI: CPT | Mod: TC | Performed by: RADIOLOGY

## 2025-06-19 ENCOUNTER — OFFICE VISIT (OUTPATIENT)
Dept: FAMILY MEDICINE | Facility: CLINIC | Age: 55
End: 2025-06-19
Payer: COMMERCIAL

## 2025-06-19 VITALS
BODY MASS INDEX: 21.99 KG/M2 | HEIGHT: 63 IN | DIASTOLIC BLOOD PRESSURE: 84 MMHG | OXYGEN SATURATION: 97 % | WEIGHT: 124.1 LBS | RESPIRATION RATE: 16 BRPM | SYSTOLIC BLOOD PRESSURE: 149 MMHG | HEART RATE: 65 BPM | TEMPERATURE: 99.4 F

## 2025-06-19 DIAGNOSIS — L57.0 ACTINIC KERATOSIS: ICD-10-CM

## 2025-06-19 DIAGNOSIS — J45.30 MILD PERSISTENT ASTHMA WITHOUT COMPLICATION: ICD-10-CM

## 2025-06-19 DIAGNOSIS — L72.0 MILIAL CYST: ICD-10-CM

## 2025-06-19 DIAGNOSIS — Z00.00 ROUTINE GENERAL MEDICAL EXAMINATION AT A HEALTH CARE FACILITY: Primary | ICD-10-CM

## 2025-06-19 RX ORDER — ALBUTEROL SULFATE 90 UG/1
2 INHALANT RESPIRATORY (INHALATION) EVERY 4 HOURS PRN
Qty: 6.7 G | Refills: 1 | Status: SHIPPED | OUTPATIENT
Start: 2025-06-19

## 2025-06-19 RX ORDER — FLUTICASONE PROPIONATE AND SALMETEROL 500; 50 UG/1; UG/1
1 POWDER RESPIRATORY (INHALATION) 2 TIMES DAILY
Qty: 60 EACH | Refills: 11 | Status: SHIPPED | OUTPATIENT
Start: 2025-06-19

## 2025-06-19 SDOH — HEALTH STABILITY: PHYSICAL HEALTH: ON AVERAGE, HOW MANY MINUTES DO YOU ENGAGE IN EXERCISE AT THIS LEVEL?: 60 MIN

## 2025-06-19 SDOH — HEALTH STABILITY: PHYSICAL HEALTH: ON AVERAGE, HOW MANY DAYS PER WEEK DO YOU ENGAGE IN MODERATE TO STRENUOUS EXERCISE (LIKE A BRISK WALK)?: 6 DAYS

## 2025-06-19 ASSESSMENT — ASTHMA QUESTIONNAIRES
ACT_TOTALSCORE: 16
QUESTION_1 LAST FOUR WEEKS HOW MUCH OF THE TIME DID YOUR ASTHMA KEEP YOU FROM GETTING AS MUCH DONE AT WORK, SCHOOL OR AT HOME: SOME OF THE TIME
QUESTION_5 LAST FOUR WEEKS HOW WOULD YOU RATE YOUR ASTHMA CONTROL: SOMEWHAT CONTROLLED
QUESTION_4 LAST FOUR WEEKS HOW OFTEN HAVE YOU USED YOUR RESCUE INHALER OR NEBULIZER MEDICATION (SUCH AS ALBUTEROL): ONE OR TWO TIMES PER DAY
QUESTION_2 LAST FOUR WEEKS HOW OFTEN HAVE YOU HAD SHORTNESS OF BREATH: THREE TO SIX TIMES A WEEK
QUESTION_3 LAST FOUR WEEKS HOW OFTEN DID YOUR ASTHMA SYMPTOMS (WHEEZING, COUGHING, SHORTNESS OF BREATH, CHEST TIGHTNESS OR PAIN) WAKE YOU UP AT NIGHT OR EARLIER THAN USUAL IN THE MORNING: NOT AT ALL

## 2025-06-19 ASSESSMENT — SOCIAL DETERMINANTS OF HEALTH (SDOH): HOW OFTEN DO YOU GET TOGETHER WITH FRIENDS OR RELATIVES?: NEVER

## 2025-06-19 NOTE — PROGRESS NOTES
Preventive Care Visit  [facility]  Scooby Jaquez MD, Family Medicine  Jun 19, 2025      Assessment & Plan     Routine general medical examination at a health care facility  We have discussed health maintenance, routine follow-up, immunizations, heart healthy diet, regular activity, weight maintenance.     Mild persistent asthma without complication  This is usually well-controlled with the current medication.  She has had some troubles lately with her quality when she works out outside.  - albuterol (PROAIR HFA/PROVENTIL HFA/VENTOLIN HFA) 108 (90 Base) MCG/ACT inhaler; Inhale 2 puffs into the lungs every 4 hours as needed for shortness of breath, wheezing or cough.  - fluticasone-salmeterol (ADVAIR) 500-50 MCG/ACT inhaler; Inhale 1 puff into the lungs 2 times daily.    Actinic keratosis  2 mm lesion on the nose, 2 mm lesion on the left cheek  Both treated with liquid nitrogen, follow-up if not resolving    Milial cyst  2 mm milia cyst on the nose  This was cleansed with alcohol, small incision made with 11 blade, contents easily expressed.  Sterile dressing has been applied.    Patient has been advised of split billing requirements and indicates understanding: Yes      Reviewed preventive health counseling, as reflected in patient instructions  Counseling  Appropriate preventive services were addressed with this patient via screening, questionnaire, or discussion as appropriate for fall prevention, nutrition, physical activity, Tobacco-use cessation, social engagement, weight loss and cognition.  Checklist reviewing preventive services available has been given to the patient.  Reviewed patient's diet, addressing concerns and/or questions.   Patient is at risk for social isolation and has been provided with information about the benefit of social connection.   She is at risk for psychosocial distress and has been provided with information to reduce risk.             Subjective   Bebe  is a 55 year old, presenting for the following:  Physical (Annual px, check growths on skin.)        6/19/2025    11:19 AM   Additional Questions   Roomed by Lanette MAHARAJ CMA   Accompanied by self          HPI  Patient is here for wellness exam.  She is up-to-date on preventive services.  Her asthma has been well-controlled.  Poor air quality in the recent past has been troublesome for her.  She has some skin lesions on the nose she like to have checked today.  She is up-to-date with visits to the dentist and eye doctor.         Advance Care Planning    Discussed advance care planning with patient; informed AVS has link to [facility] [employer].        6/19/2025   General Health   How would you rate your overall physical health? Good   Feel stress (tense, anxious, or unable to sleep) Rather much   (!) STRESS CONCERN      6/19/2025   Nutrition   Three or more servings of calcium each day? (!) I DON'T KNOW   Diet: Regular (no restrictions)   How many servings of fruit and vegetables per day? 4 or more   How many sweetened beverages each day? 0-1         6/19/2025   Exercise   Days per week of moderate/strenous exercise 6 days   Average minutes spent exercising at this level 60 min         6/19/2025   Social Factors   Frequency of gathering with friends or relatives Never   Worry food won't last until get money to buy more No   Food not last or not have enough money for food? No   Do you have housing? (Housing is defined as stable permanent housing and does not include staying outside in a car, in a tent, in an abandoned building, in an overnight shelter, or couch-surfing.) Yes   Are you worried about losing your housing? No   Lack of transportation? No   Unable to get utilities (heat,electricity)? No   (!) SOCIAL CONNECTIONS CONCERN      6/19/2025   Fall Risk   Fallen 2 or more times in the past year? No   Trouble with walking or balance? No          6/19/2025   Dental   Dentist two times every year? Yes           Today's PHQ-2  Score:       1/10/2025     2:10 PM   PHQ-2 ( 1999 [employer])   Q1: Little interest or pleasure in doing things 1   Q2: Feeling down, depressed or hopeless 1   PHQ-2 Score 2    Q1: Little interest or pleasure in doing things Several days   Q2: Feeling down, depressed or hopeless Several days   PHQ-2 Score 2       Patient-reported         6/19/2025   Substance Use   Alcohol more than 3/day or more than 7/wk No   Do you use any other substances recreationally? No     Social History     Tobacco Use    Smoking status: Never     Passive exposure: Never    Smokeless tobacco: Never   Vaping Use    Vaping status: Never Used           6/16/2025   LAST FHS-7 RESULTS   1st degree relative breast or ovarian cancer No   Any relative bilateral breast cancer No   Any male have breast cancer No   Any ONE woman have BOTH breast AND ovarian cancer No   Any woman with breast cancer before 50yrs No   2 or more relatives with breast AND/OR ovarian cancer No   2 or more relatives with breast AND/OR bowel cancer No                6/19/2025   STI Screening   New sexual partner(s) since last STI/HIV test? No     History of abnormal Pap smear: No - age 30- 64 PAP with HPV every 5 years recommended        Latest Ref Rng & Units 7/20/2022     1:54 PM   PAP / HPV   PAP  Negative for Intraepithelial Lesion or Malignancy (NILM)    HPV 16 DNA Negative Negative    HPV 18 DNA Negative Negative    Other HR HPV Negative Negative      ASCVD Risk   The 10-year ASCVD risk score (Rivka JADE, et al., 2019) is: 2.7%    Values used to calculate the score:      Age: 55 years      Sex: Female      Is Non- : No      Diabetic: No      Tobacco smoker: No      Systolic Blood Pressure: 149 mmHg      Is BP treated: No      HDL Cholesterol: 73 mg/dL      Total Cholesterol: 212 mg/dL           Reviewed and updated as needed this visit by Provider                    Past Medical History:   Diagnosis Date    Mild persistent asthma without  "complication      Past Surgical History:   Procedure Laterality Date    ABDOMINAL HERNIA REPAIR N/A      SECTION      x 2    EGD N/A 2019    normal    HERNIORRHAPHY INGUINAL      no date given    TONSILLECTOMY      no date given     OB History   No obstetric history on file.     Labs reviewed in EPIC  BP Readings from Last 3 Encounters:   25 (!) 149/84   04/15/25 136/80   01/10/25 136/85    Wt Readings from Last 3 Encounters:   25 56.3 kg (124 lb 1.6 oz)   04/15/25 58.9 kg (129 lb 12.8 oz)   01/10/25 55.3 kg (122 lb)                      Review of Systems  Constitutional, neuro, ENT, endocrine, pulmonary, cardiac, gastrointestinal, genitourinary, musculoskeletal, integument and psychiatric systems are negative, except as otherwise noted.     Objective    Exam  BP (!) 149/84 (BP Location: Right arm, Patient Position: Sitting, Cuff Size: Adult Regular)   Pulse 65   Temp 99.4  F (37.4  C) (Tympanic)   Resp 16   Ht 1.588 m (5' 2.5\")   Wt 56.3 kg (124 lb 1.6 oz)   LMP  (LMP Unknown)   SpO2 97%   BMI 22.34 kg/m     Estimated body mass index is 22.34 kg/m  as calculated from the following:    Height as of this encounter: 1.588 m (5' 2.5\").    Weight as of this encounter: 56.3 kg (124 lb 1.6 oz).    Physical Exam  GENERAL: alert and no distress  EYES: Eyes grossly normal to inspection, PERRL and conjunctivae and sclerae normal  HENT: ear canals and TM's normal, nose and mouth without ulcers or lesions  NECK: no adenopathy, no asymmetry, masses, or scars  RESP: lungs clear to auscultation - no rales, rhonchi or wheezes  CV: regular rate and rhythm, normal S1 S2, no S3 or S4, no murmur, click or rub, no peripheral edema  ABDOMEN: soft, nontender, no hepatosplenomegaly, no masses and bowel sounds normal  MS: no gross musculoskeletal defects noted, no edema  SKIN: no suspicious lesions or rashes, keratoses - actinic # 2 - frozen, and 2 mm papular lesion on the ends of the nose  NEURO: Normal " strength and tone, mentation intact and speech normal  PSYCH: mentation appears normal, affect normal/bright        Signed Electronically by: Scooby Jaquez MD

## 2025-06-19 NOTE — PATIENT INSTRUCTIONS
Patient Education   Preventive Care Advice   This is general advice given by our system to help you stay healthy. However, your care team may have specific advice just for you. Please talk to your care team about your preventive care needs.  Nutrition  Eat 5 or more servings of fruits and vegetables each day.  Try wheat bread, brown rice and whole grain pasta (instead of white bread, rice, and pasta).  Get enough calcium and vitamin D. Check the label on foods and aim for 100% of the RDA (recommended daily allowance).  Lifestyle  Exercise at least 150 minutes each week  (30 minutes a day, 5 days a week).  Do muscle strengthening activities 2 days a week. These help control your weight and prevent disease.  No smoking.  Wear sunscreen to prevent skin cancer.  Have a dental exam and cleaning every 6 months.  Yearly exams  See your health care team every year to talk about:  Any changes in your health.  Any medicines your care team has prescribed.  Preventive care, family planning, and ways to prevent chronic diseases.  Shots (vaccines)   HPV shots (up to age 26), if you've never had them before.  Hepatitis B shots (up to age 59), if you've never had them before.  COVID-19 shot: Get this shot when it's due.  Flu shot: Get a flu shot every year.  Tetanus shot: Get a tetanus shot every 10 years.  Pneumococcal, hepatitis A, and RSV shots: Ask your care team if you need these based on your risk.  Shingles shot (for age 50 and up)  General health tests  Diabetes screening:  Starting at age 35, Get screened for diabetes at least every 3 years.  If you are younger than age 35, ask your care team if you should be screened for diabetes.  Cholesterol test: At age 39, start having a cholesterol test every 5 years, or more often if advised.  Bone density scan (DEXA): At age 50, ask your care team if you should have this scan for osteoporosis (brittle bones).  Hepatitis C: Get tested at least once in your life.  STIs (sexually  transmitted infections)  Before age 24: Ask your care team if you should be screened for STIs.  After age 24: Get screened for STIs if you're at risk. You are at risk for STIs (including HIV) if:  You are sexually active with more than one person.  You don't use condoms every time.  You or a partner was diagnosed with a sexually transmitted infection.  If you are at risk for HIV, ask about PrEP medicine to prevent HIV.  Get tested for HIV at least once in your life, whether you are at risk for HIV or not.  Cancer screening tests  Cervical cancer screening: If you have a cervix, begin getting regular cervical cancer screening tests starting at age 21.  Breast cancer scan (mammogram): If you've ever had breasts, begin having regular mammograms starting at age 40. This is a scan to check for breast cancer.  Colon cancer screening: It is important to start screening for colon cancer at age 45.  Have a colonoscopy test every 10 years (or more often if you're at risk) Or, ask your provider about stool tests like a FIT test every year or Cologuard test every 3 years.  To learn more about your testing options, visit:   .  For help making a decision, visit:   https://bit.ly/ns42623.  Prostate cancer screening test: If you have a prostate, ask your care team if a prostate cancer screening test (PSA) at age 55 is right for you.  Lung cancer screening: If you are a current or former smoker ages 50 to 80, ask your care team if ongoing lung cancer screenings are right for you.  For informational purposes only. Not to replace the advice of your health care provider. Copyright   2023 [facility]. All rights reserved. Clinically reviewed by the [facility] Transitions Program. [employer] 362229 - REV 01/24.  Relationships for Good Health  Relationships are important for our health and happiness. Social isolation, loneliness and lack of support are bad for your health. Studies show that loneliness can harm health  and limit your life span as much as high blood pressure and smoking.   Take some time to reflect on your relationships. Then answer these questions:  Are there people in your life that cause you stress or drain your energy? What can you do to set limits?  ________________________________________________________________________________________________________________________________________________________________________________________________________________________________________________________________________________________________________________________________________________  Who do you enjoy spending time with? Who can you go to for support?  ________________________________________________________________________________________________________________________________________________________________________________________________________________________________________________________________________________________________________________________________________________  What can you do to improve your relationships with others?  __________________________________________________________________________________________________________________________________________________________________________________________________________________  ______________________________________________________________________________________________________________________________  What do you like most about your relationships with others?  ________________________________________________________________________________________________________________________________________________________________________________________________________________________________________________________________________________________________________________________________________________  My goal: ______________________________________________________________________  I will:  ______________________________________________________________________________________________________________________________________________________________________________________________    For informational purposes only. Not to replace the advice of your health care provider. Copyright   2018 [facility]. All rights reserved. Clinically reviewed by [facility]  Team. [employer] 140862 - Rev 06/24.  Learning About Stress  What is stress?     Stress is your body's response to a hard situation. Your body can have a physical, emotional, or mental response. Stress is a fact of life for most people, and it affects everyone differently. What causes stress for you may not be stressful for someone else.  A lot of things can cause stress. You may feel stress when you go on a job interview, take a test, or run a race. This kind of short-term stress is normal and even useful. It can help you if you need to work hard or react quickly. For example, stress can help you finish an important job on time.  Long-term stress is caused by ongoing stressful situations or events. Examples of long-term stress include long-term health problems, ongoing problems at work, or conflicts in your family. Long-term stress can harm your health.  How does stress affect your health?  When you are stressed, your body responds as though you are in danger. It makes hormones that speed up your heart, make you breathe faster, and give you a burst of energy. This is called the fight-or-flight stress response. If the stress is over quickly, your body goes back to normal and no harm is done.  But if stress happens too often or lasts too long, it can have bad effects. Long-term stress can make you more likely to get sick, and it can make symptoms of some diseases worse. If you tense up when you are stressed, you may develop neck, shoulder, or low back pain. Stress is linked to high blood pressure and heart disease.  Stress also  harms your emotional health. It can make you ochoa, tense, or depressed. Your relationships may suffer, and you may not do well at work or school.  What can you do to manage stress?  You can try these things to help manage stress:   Do something active. Exercise or activity can help reduce stress. Walking is a great way to get started. Even everyday activities such as housecleaning or yard work can help.  Try yoga or gaurang chi. These techniques combine exercise and meditation. You may need some training at first to learn them.  Do something you enjoy. For example, listen to music or go to a movie. Practice your hobby or do volunteer work.  Meditate. This can help you relax, because you are not worrying about what happened before or what may happen in the future.  Do guided imagery. Imagine yourself in any setting that helps you feel calm. You can use online videos, books, or a teacher to guide you.  Do breathing exercises. For example:  From a standing position, bend forward from the waist with your knees slightly bent. Let your arms dangle close to the floor.  Breathe in slowly and deeply as you return to a standing position. Roll up slowly and lift your head last.  Hold your breath for just a few seconds in the standing position.  Breathe out slowly and bend forward from the waist.  Let your feelings out. Talk, laugh, cry, and express anger when you need to. Talking with supportive friends or family, a counselor, or a jeffery leader about your feelings is a healthy way to relieve stress. Avoid discussing your feelings with people who make you feel worse.  Write. It may help to write about things that are bothering you. This helps you find out how much stress you feel and what is causing it. When you know this, you can find better ways to cope.  What can you do to prevent stress?  You might try some of these things to help prevent stress:  Manage your time. This helps you find time to do the things you want and need to  "do.  Get enough sleep. Your body recovers from the stresses of the day while you are sleeping.  Get support. Your family, friends, and community can make a difference in how you experience stress.  Limit your news feed. Avoid or limit time on social media or news that may make you feel stressed.  Do something active. Exercise or activity can help reduce stress. Walking is a great way to get started.  Where can you learn more?  Go to https://www.[employer].net/patiented  Enter N032 in the search box to learn more about \"Learning About Stress.\"  Current as of: October 24, 2024  Content Version: 14.5 2024-2025 [employer].   Care instructions adapted under license by your healthcare professional. If you have questions about a medical condition or this instruction, always ask your healthcare professional. [employer] disclaims any warranty or liability for your use of this information.    Eating Healthy Foods: Care Instructions  With every meal, you can make healthy food choices. Try to eat a variety of fruits, vegetables, whole grains, lean proteins, and low-fat dairy products. This can help you get the right balance of nutrients, including vitamins and minerals. Small changes add up over time. You can start by adding one healthy food to your meals each day.    Try to make half your plate fruits and vegetables, one-fourth whole grains, and one-fourth lean proteins. Try including dairy with your meals.   Eat more fruits and vegetables. Try to have them with most meals and snacks.   Foods for healthy eating        Fruits   These can be fresh, frozen, canned, or dried.  Try to choose whole fruit rather than fruit juice.  Eat a variety of colors.        Vegetables   These can be fresh, frozen, canned, or dried.  Beans, peas, and lentils count too.        Whole grains   Choose whole-grain breads, cereals, and noodles.  Try brown rice.        Lean proteins   These can include lean meat, poultry, " "fish, and eggs.  You can also have tofu, beans, peas, lentils, nuts, and seeds.        Dairy   Try milk, yogurt, and cheese.  Choose low-fat or fat-free when you can.  If you need to, use lactose-free milk or fortified plant-based milk products, such as soy milk.        Water   Drink water when you're thirsty.  Limit sugar-sweetened drinks, including soda, fruit drinks, and sports drinks.  Where can you learn more?  Go to https://www.[employer].net/patiented  Enter T756 in the search box to learn more about \"Eating Healthy Foods: Care Instructions.\"  Current as of: October 7, 2024  Content Version: 14.5 2024-2025 [employer].   Care instructions adapted under license by your healthcare professional. If you have questions about a medical condition or this instruction, always ask your healthcare professional. [employer] disclaims any warranty or liability for your use of this information.       "

## 2025-06-23 ENCOUNTER — PATIENT OUTREACH (OUTPATIENT)
Dept: CARE COORDINATION | Facility: CLINIC | Age: 55
End: 2025-06-23
Payer: COMMERCIAL

## 2025-07-11 DIAGNOSIS — J45.30 MILD PERSISTENT ASTHMA WITHOUT COMPLICATION: ICD-10-CM

## 2025-07-14 RX ORDER — FLUTICASONE PROPIONATE AND SALMETEROL 500; 50 UG/1; UG/1
1 POWDER RESPIRATORY (INHALATION) 2 TIMES DAILY
Qty: 1 EACH | Refills: 5 | Status: SHIPPED | OUTPATIENT
Start: 2025-07-14